# Patient Record
Sex: FEMALE | Race: WHITE | NOT HISPANIC OR LATINO | Employment: FULL TIME
[De-identification: names, ages, dates, MRNs, and addresses within clinical notes are randomized per-mention and may not be internally consistent; named-entity substitution may affect disease eponyms.]

---

## 2017-09-19 ENCOUNTER — CHARTING TRANS (OUTPATIENT)
Dept: OTHER | Age: 42
End: 2017-09-19

## 2017-09-19 ENCOUNTER — IMAGING SERVICES (OUTPATIENT)
Dept: OTHER | Age: 42
End: 2017-09-19

## 2017-09-19 ENCOUNTER — HOSPITAL (OUTPATIENT)
Dept: OTHER | Age: 42
End: 2017-09-19
Attending: OBSTETRICS & GYNECOLOGY

## 2017-09-19 ENCOUNTER — LAB SERVICES (OUTPATIENT)
Dept: OTHER | Age: 42
End: 2017-09-19

## 2017-10-01 ENCOUNTER — CHARTING TRANS (OUTPATIENT)
Dept: OTHER | Age: 42
End: 2017-10-01

## 2017-10-09 LAB — PAP WITH HIGH RISK HPV: NORMAL

## 2017-12-08 ENCOUNTER — IMAGING SERVICES (OUTPATIENT)
Dept: OTHER | Age: 42
End: 2017-12-08

## 2017-12-08 ENCOUNTER — HOSPITAL (OUTPATIENT)
Dept: OTHER | Age: 42
End: 2017-12-08
Attending: OBSTETRICS & GYNECOLOGY

## 2017-12-14 ENCOUNTER — CHARTING TRANS (OUTPATIENT)
Dept: OTHER | Age: 42
End: 2017-12-14

## 2018-06-17 ENCOUNTER — HOSPITAL (OUTPATIENT)
Dept: OTHER | Age: 43
End: 2018-06-17
Attending: EMERGENCY MEDICINE

## 2018-06-17 ENCOUNTER — DIAGNOSTIC TRANS (OUTPATIENT)
Dept: OTHER | Age: 43
End: 2018-06-17

## 2018-06-17 LAB
ALBUMIN SERPL-MCNC: 3.8 GM/DL (ref 3.6–5.1)
ALP SERPL-CCNC: 77 UNIT/L (ref 45–117)
ALT SERPL-CCNC: 22 UNIT/L
ANALYZER ANC (IANC): ABNORMAL
ANION GAP SERPL CALC-SCNC: 14 MMOL/L (ref 10–20)
APPEARANCE UR: CLEAR
AST SERPL-CCNC: 21 UNIT/L
BASOPHILS # BLD: 0 THOUSAND/MCL (ref 0–0.3)
BASOPHILS NFR BLD: 0 %
BILIRUB CONJ SERPL-MCNC: 0.1 MG/DL (ref 0–0.2)
BILIRUB SERPL-MCNC: 0.4 MG/DL (ref 0.2–1)
BILIRUB UR QL STRIP: NEGATIVE
BUN SERPL-MCNC: 8 MG/DL (ref 6–20)
BUN/CREAT SERPL: 9 (ref 7–25)
CALCIUM SERPL-MCNC: 9.5 MG/DL (ref 8.4–10.2)
CHLORIDE: 103 MMOL/L (ref 98–107)
CO2 SERPL-SCNC: 26 MMOL/L (ref 21–32)
COLOR UR: YELLOW
CREAT SERPL-MCNC: 0.86 MG/DL (ref 0.51–0.95)
DIFFERENTIAL METHOD BLD: ABNORMAL
EOSINOPHIL # BLD: 0.1 THOUSAND/MCL (ref 0.1–0.5)
EOSINOPHIL NFR BLD: 1 %
ERYTHROCYTE [DISTWIDTH] IN BLOOD: 12.5 % (ref 11–15)
GLUCOSE SERPL-MCNC: 83 MG/DL (ref 65–99)
GLUCOSE UR STRIP-MCNC: NEGATIVE MG/DL
HEMATOCRIT: 39.6 % (ref 36–46.5)
HEMOCCULT STL QL: ABNORMAL
HGB BLD-MCNC: 13.9 GM/DL (ref 12–15.5)
KETONES UR STRIP-MCNC: 15 MG/DL
LEUKOCYTE ESTERASE UR QL STRIP: NEGATIVE
LIPASE SERPL-CCNC: 534 UNIT/L (ref 73–393)
LYMPHOCYTES # BLD: 2.3 THOUSAND/MCL (ref 1–4.8)
LYMPHOCYTES NFR BLD: 17 %
MCH RBC QN AUTO: 30.3 PG (ref 26–34)
MCHC RBC AUTO-ENTMCNC: 35.1 GM/DL (ref 32–36.5)
MCV RBC AUTO: 86.3 FL (ref 78–100)
MONOCYTES # BLD: 0.8 THOUSAND/MCL (ref 0.3–0.9)
MONOCYTES NFR BLD: 6 %
NEUTROPHILS # BLD: 10.2 THOUSAND/MCL (ref 1.8–7.7)
NEUTROPHILS NFR BLD: 76 %
NEUTS SEG NFR BLD: ABNORMAL %
NITRITE UR QL STRIP: NEGATIVE
NRBC (NRBCRE): ABNORMAL
PH UR STRIP: 6 UNIT (ref 5–7)
PLATELET # BLD: 386 THOUSAND/MCL (ref 140–450)
POTASSIUM SERPL-SCNC: 3.7 MMOL/L (ref 3.4–5.1)
PROT SERPL-MCNC: 7.6 GM/DL (ref 6.4–8.2)
PROT UR STRIP-MCNC: NEGATIVE MG/DL
RBC # BLD: 4.59 MILLION/MCL (ref 4–5.2)
SODIUM SERPL-SCNC: 139 MMOL/L (ref 135–145)
SP GR UR STRIP: >1.03 (ref 1–1.03)
TROPONIN I SERPL HS-MCNC: <0.02 NG/ML
UROBILINOGEN UR STRIP-MCNC: 0.2 MG/DL (ref 0–1)
WBC # BLD: 13.5 THOUSAND/MCL (ref 4.2–11)

## 2018-08-02 ENCOUNTER — HOSPITAL (OUTPATIENT)
Dept: OTHER | Age: 43
End: 2018-08-02
Attending: OBSTETRICS & GYNECOLOGY

## 2018-08-02 ENCOUNTER — IMAGING SERVICES (OUTPATIENT)
Dept: OTHER | Age: 43
End: 2018-08-02

## 2018-08-03 ENCOUNTER — CHARTING TRANS (OUTPATIENT)
Dept: OTHER | Age: 43
End: 2018-08-03

## 2018-11-02 VITALS
BODY MASS INDEX: 26.34 KG/M2 | HEIGHT: 70 IN | WEIGHT: 184 LBS | SYSTOLIC BLOOD PRESSURE: 110 MMHG | DIASTOLIC BLOOD PRESSURE: 68 MMHG

## 2018-11-15 ENCOUNTER — HOSPITAL (OUTPATIENT)
Dept: OTHER | Age: 43
End: 2018-11-15
Attending: HOSPITALIST

## 2018-11-15 ENCOUNTER — IMAGING SERVICES (OUTPATIENT)
Dept: OTHER | Age: 43
End: 2018-11-15

## 2018-11-15 LAB
ANALYZER ANC (IANC): NORMAL
ANION GAP SERPL CALC-SCNC: 13 MMOL/L (ref 10–20)
BASOPHILS # BLD: 0 THOUSAND/MCL (ref 0–0.3)
BASOPHILS NFR BLD: 0 %
BUN SERPL-MCNC: 11 MG/DL (ref 6–20)
BUN/CREAT SERPL: 11 (ref 7–25)
CALCIUM SERPL-MCNC: 9.5 MG/DL (ref 8.4–10.2)
CHLORIDE: 106 MMOL/L (ref 98–107)
CO2 SERPL-SCNC: 26 MMOL/L (ref 21–32)
CREAT SERPL-MCNC: 1.02 MG/DL (ref 0.51–0.95)
DIFFERENTIAL METHOD BLD: NORMAL
EOSINOPHIL # BLD: 0.2 THOUSAND/MCL (ref 0.1–0.5)
EOSINOPHIL NFR BLD: 1 %
ERYTHROCYTE [DISTWIDTH] IN BLOOD: 12.4 % (ref 11–15)
GLUCOSE SERPL-MCNC: 86 MG/DL (ref 65–99)
HEMATOCRIT: 37.5 % (ref 36–46.5)
HGB BLD-MCNC: 12.6 GM/DL (ref 12–15.5)
LYMPHOCYTES # BLD: 2.7 THOUSAND/MCL (ref 1–4.8)
LYMPHOCYTES NFR BLD: 25 %
MAGNESIUM SERPL-MCNC: 2.2 MG/DL (ref 1.7–2.4)
MCH RBC QN AUTO: 29.5 PG (ref 26–34)
MCHC RBC AUTO-ENTMCNC: 33.6 GM/DL (ref 32–36.5)
MCV RBC AUTO: 87.8 FL (ref 78–100)
MONOCYTES # BLD: 0.8 THOUSAND/MCL (ref 0.3–0.9)
MONOCYTES NFR BLD: 8 %
NEUTROPHILS # BLD: 7.2 THOUSAND/MCL (ref 1.8–7.7)
NEUTROPHILS NFR BLD: 66 %
NEUTS SEG NFR BLD: NORMAL %
NRBC (NRBCRE): NORMAL
PLATELET # BLD: 371 THOUSAND/MCL (ref 140–450)
POTASSIUM SERPL-SCNC: 3.9 MMOL/L (ref 3.4–5.1)
RBC # BLD: 4.27 MILLION/MCL (ref 4–5.2)
SODIUM SERPL-SCNC: 141 MMOL/L (ref 135–145)
WBC # BLD: 10.9 THOUSAND/MCL (ref 4.2–11)

## 2018-12-17 ENCOUNTER — OFFICE VISIT (OUTPATIENT)
Dept: PHYSICAL THERAPY | Facility: HOSPITAL | Age: 43
End: 2018-12-17
Attending: Other
Payer: COMMERCIAL

## 2018-12-17 ENCOUNTER — ORDER TRANSCRIPTION (OUTPATIENT)
Dept: PHYSICAL THERAPY | Facility: HOSPITAL | Age: 43
End: 2018-12-17

## 2018-12-17 DIAGNOSIS — M54.50 LOW BACK PAIN: Primary | ICD-10-CM

## 2018-12-17 PROCEDURE — 97530 THERAPEUTIC ACTIVITIES: CPT | Performed by: PHYSICAL THERAPIST

## 2018-12-17 PROCEDURE — 97163 PT EVAL HIGH COMPLEX 45 MIN: CPT | Performed by: PHYSICAL THERAPIST

## 2018-12-17 PROCEDURE — 97112 NEUROMUSCULAR REEDUCATION: CPT | Performed by: PHYSICAL THERAPIST

## 2018-12-17 NOTE — PROGRESS NOTES
LUMBAR SPINE EVALUATION:   Referring Physician: Dr. Karina Flores  Diagnosis: low back pain   Date of Service: 12/17/2018   Date of Onset: this derick Fragoso is a 37year old y/o female who presents to therapy today with complaints LBP. COLONOSCOPY, POSSIBLE BIOPSY, POSSIBLE POLYPECTOMY 22943 N/A 2/17/2017     Performed by Wesley Blackmon MD at [address] [place] [address]   • ESOPHAGOGASTRODUODENOSCOPY, POSSIBLE BIOPSY, POSSIBLE POLYPECTOMY 77433 N/A 3/7/2013     Performed by Wesley Blackmon, flow. 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   The remainder of the anterior intracranial arterial circulation inclu and she is unable to participate in exercise. She will benefit from skilled PT to improve her core control and her functional mobility. Progress will be limited by her limited ability to exert.       Precautions: in exertional activity, no manual work on eval) (LB, thoracic spine, cervical spine and bilateral knee pain, 3 finger diastasis, anxiety, weight/diet issues, unable to work out, squat, tolerate sitting/standing/walking, significant ADL restrictions).       Total Timed treatment: 60 min      Total T

## 2018-12-27 ENCOUNTER — OFFICE VISIT (OUTPATIENT)
Dept: PHYSICAL THERAPY | Facility: HOSPITAL | Age: 43
End: 2018-12-27
Attending: Other
Payer: COMMERCIAL

## 2018-12-27 PROCEDURE — 97112 NEUROMUSCULAR REEDUCATION: CPT | Performed by: PHYSICAL THERAPIST

## 2018-12-27 NOTE — PROGRESS NOTES
12/27/2018  Dx:  Low back pain             Authorized # of Visits:  2/18          Next MD visit: none   Fall Risk: standard         Precautions: see below       Past Medical History:   Diagnosis Date   • Allergic rhinitis     • Anxiety state, unspecified   Aki Taylor MD on 11/7/2018  4:14 PM      Study Result     DATE OF SERVICE: 11.06.2018  MR ANGIOGRAM (MRA) BRAIN STUDY  HISTORY: Pulsatile tinnitus, left ear  TECHNIQUE: Routine 3D Time-of-flight imaging of the [place] of [place] was performed without the use she is feeling ok. Just has some tightness in her L UT. States she feels very \"unhealthy\" and \"like she is an old women\". States she is going to start working on her diet but hasn't started yet.   Also reports she has been too busy to walk because of 12/17/2018   Date of Onset: this fall    PATIENT Мария Brandt is a 37year old y/o female who presents to therapy today with complaints LBP. Reports her back pain comes and goes. States she had pain down her R buttock and R LE to the knee.   Stat MD at [address]   • ESOPHAGOGASTRODUODENOSCOPY, POSSIBLE BIOPSY, POSSIBLE POLYPECTOMY 66517 N/A 3/7/2013     Performed by Maribel Bernal MD at [address] [facility] [address] REPORT          benign tumor of uterus - fibrothe 4.8 x 3.6 x 6.0 mm in orthogonal axial and craniocaudad  dimensions, respectively.   The remainder of the anterior intracranial arterial circulation including the intracranial internal  carotid arteries, middle cerebral arteries, and anterior cerebral arter control and her functional mobility. Progress will be limited by her limited ability to exert.       Precautions: in exertional activity, no manual work on the cervical spine   OBJECTIVE:   Observation/Posture: FHP, increased thoracic kyphosis, forward, ro

## 2019-01-08 ENCOUNTER — OFFICE VISIT (OUTPATIENT)
Dept: PHYSICAL THERAPY | Facility: HOSPITAL | Age: 44
End: 2019-01-08
Attending: Other
Payer: COMMERCIAL

## 2019-01-08 PROCEDURE — 97112 NEUROMUSCULAR REEDUCATION: CPT | Performed by: PHYSICAL THERAPIST

## 2019-01-08 NOTE — PROGRESS NOTES
1/8/2019  Dx: Low back pain           Authorized # of Visits:  3/18          Next MD visit: none   Fall Risk: standard         Precautions: see below       Past Medical History:   Diagnosis Date   • Allergic rhinitis     • Anxiety state, unspecified     • MD Lissette on 11/7/2018  4:14 PM      Study Result     DATE OF SERVICE: 11.06.2018  MR ANGIOGRAM (MRA) BRAIN STUDY  HISTORY: Pulsatile tinnitus, left ear  TECHNIQUE: Routine 3D Time-of-flight imaging of the [place] of [place] was performed without the use  of went gluten free a week ago and has cut down dramatically on sugar. Has gotten a lot of HA's from changing her diet. Has been doing his HEP. Got a massage but stated they avoided her neck. Reports she is very anxious about her artery.   Reports she isn' Exercises; Neuromuscular Re-education; Therapeutic Activity; Patient education: Home exercise program instruction; TNE Education; Modalities as needed.     Education or treatment limitation: None  Rehab Potential:good     FOTO: 50% limitation currently, 42 rhinitis     • Anxiety state, unspecified     • Backache, unspecified     • Esophageal reflux EGD 3/13   • Fibroid     • IBS     • Other forms of migraine     • Vitamin D insufficiency              Past Surgical History:   Procedure Laterality Date   • COL [place] of [place] was performed without the use  of intravenous contrast. 3D reconstructions were performed. Acquisitions also supplemented by 3D MIP  images, constructed at the acquisition scanner. Closed 1. 5-Shivani multichannel MR system was  utilized.   FI the treatment of her LBP as she has been instructed not to have treatment on her cervical spine because of her current condition.      She presents to therapy with limited lumbar ROM with significantly decreased lumbar extension, a 3 plus finger diastasis, bilaterally  Accessory Motion: NT   Special Tests: (-) Slump, (-) SLR bilaterally    (mod level eval) (LB, thoracic spine, cervical spine and bilateral knee pain, 3 finger diastasis, anxiety, weight/diet issues, unable to work out, squat, tolerate sitting/

## 2019-01-15 ENCOUNTER — OFFICE VISIT (OUTPATIENT)
Dept: PHYSICAL THERAPY | Facility: HOSPITAL | Age: 44
End: 2019-01-15
Attending: Other
Payer: COMMERCIAL

## 2019-01-15 PROCEDURE — 97112 NEUROMUSCULAR REEDUCATION: CPT | Performed by: PHYSICAL THERAPIST

## 2019-01-15 NOTE — PROGRESS NOTES
1/15/2019  Dx: Low back pain           Authorized # of Visits:  4/18          Next MD visit: none   Fall Risk: standard         Precautions: see below       Past Medical History:   Diagnosis Date   • Allergic rhinitis     • Anxiety state, unspecified     • MD Lissette on 11/7/2018  4:14 PM      Study Result     DATE OF SERVICE: 11.06.2018  MR ANGIOGRAM (MRA) BRAIN STUDY  HISTORY: Pulsatile tinnitus, left ear  TECHNIQUE: Routine 3D Time-of-flight imaging of the [place] of [place] was performed without the use  of her hips were sore after dong the exercises. Reports her neck has been feeling ok. Neck is better than is was at the last treatment. Reports she has been working on her HEP inconsistently.     Objective:    12/27/2018  All exercises done in supine this d Patient education: Home exercise program instruction; TNE Education; Modalities as needed.     Education or treatment limitation: None  Rehab Potential:good     FOTO: 50% limitation currently, 42% predicted at visit #12  Modified Oswestry: 33  Physical Fear unspecified     • Esophageal reflux EGD 3/13   • Fibroid     • IBS     • Other forms of migraine     • Vitamin D insufficiency              Past Surgical History:   Procedure Laterality Date   • COLONOSCOPY   2/17/17= Diverticulosis     Repeat 2027.   Needs contrast. 3D reconstructions were performed. Acquisitions also supplemented by 3D MIP  images, constructed at the acquisition scanner. Closed 1. 5-Shivani multichannel MR system was  utilized.   FINDINGS: Short segment focal dissection associated pseudoaneurys treatment on her cervical spine because of her current condition. She presents to therapy with limited lumbar ROM with significantly decreased lumbar extension, a 3 plus finger diastasis, poor abdominal control with increased use of accessory muscles. bilaterally    (mod level eval) (LB, thoracic spine, cervical spine and bilateral knee pain, 3 finger diastasis, anxiety, weight/diet issues, unable to work out, squat, tolerate sitting/standing/walking, significant ADL restrictions).              1/8/2019 true lumen. Neurology consultation is advised and appropriate follow-up depending on the remainder the patient's  clinical situation. Key findings discussed with Dr. Dorothy Emmanuel on 11/7/2018 4:11 PM.  CONCLUSION: As above. Addended by Kim Burton.  MD Lissette of  the true lumen. 2. Otherwise, unremarkable MRA brain study. PRECAUTIONS:  Was told she can walk but not to exert. Was told not to have anything done on her neck.         Medication Changes since last visit?: No  Subjective: Reports she went glu independent in her HEP. 5.  The pt will display proper body mechanics during ADL's. Frequency / Duration: Patient will be seen for 1-2 x/week or a total of 18 visits over a 90 day period. Treatment will include: Manual Therapy;  Therapeutic Exercises; Cristina describes prior level of function independent in all activities. Pt goals include decrease her pain  . Past medical history was reviewed with Cristina.  Significant findings include:    Past Medical History:   Diagnosis Date   • Allergic rhinitis     • An above.   Addended by Brayden Clark.  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 She reports she saw a chiropractor for years for neck and back pain.   She presents for the treatment of h Flexor Long  long     LE ROM:    R L   Hip Flex NT NT   ER 40 30   IR 30 40   Hip Abd [facility]   Hip Ext NT NT     Palpation: crepitus in both knees with ROM  Neuro Screen: (-) toe walking (-) heel walking, SLS less than 30 seconds bilaterally  Accessory Mo

## 2019-01-22 ENCOUNTER — OFFICE VISIT (OUTPATIENT)
Dept: PHYSICAL THERAPY | Facility: HOSPITAL | Age: 44
End: 2019-01-22
Attending: Other
Payer: COMMERCIAL

## 2019-01-22 PROCEDURE — 97112 NEUROMUSCULAR REEDUCATION: CPT | Performed by: PHYSICAL THERAPIST

## 2019-01-22 NOTE — PROGRESS NOTES
1/22/2019  Dx: Low back pain           Authorized # of Visits:  5/18          Next MD visit: none   Fall Risk: standard         Precautions: see below       Past Medical History:   Diagnosis Date   • Allergic rhinitis     • Anxiety state, unspecified     • MD Lissette on 11/7/2018  4:14 PM      Study Result     DATE OF SERVICE: 11.06.2018  MR ANGIOGRAM (MRA) BRAIN STUDY  HISTORY: Pulsatile tinnitus, left ear  TECHNIQUE: Routine 3D Time-of-flight imaging of the [place] of [place] was performed without the use  of she is very stressed today. States it is hard to get her exercises in without interruption at home. States she does a couple of exercises at a time but does report working on her breathing often.   Objective:    12/27/2018  All exercises done in supine th include: Manual Therapy; Therapeutic Exercises; Neuromuscular Re-education; Therapeutic Activity; Patient education: Home exercise program instruction; TNE Education; Modalities as needed.     Education or treatment limitation: None  Rehab Potential:good History:   Diagnosis Date   • Allergic rhinitis     • Anxiety state, unspecified     • Backache, unspecified     • Esophageal reflux EGD 3/13   • Fibroid     • IBS     • Other forms of migraine     • Vitamin D insufficiency              Past Surgical Histo Routine 3D Time-of-flight imaging of the [place] of [place] was performed without the use  of intravenous contrast. 3D reconstructions were performed. Acquisitions also supplemented by 3D MIP  images, constructed at the acquisition scanner. Closed 1. 5-Shivani neck and back pain. She presents for the treatment of her LBP as she has been instructed not to have treatment on her cervical spine because of her current condition.      She presents to therapy with limited lumbar ROM with significantly decreased lumbar walking, SLS less than 30 seconds bilaterally  Accessory Motion: NT   Special Tests: (-) Slump, (-) SLR bilaterally    (mod level eval) (LB, thoracic spine, cervical spine and bilateral knee pain, 3 finger diastasis, anxiety, weight/diet issues, unable to internal carotid artery at the level of the tip of the odontoid process. No significant stenosis of the true lumen. Neurology consultation is advised and appropriate follow-up depending on the remainder the patient's  clinical situation.   Key findings di cervical  internal carotid artery at the level of the tip of the odontoid process. No significant stenosis of  the true lumen. 2. Otherwise, unremarkable MRA brain study. PRECAUTIONS:  Was told she can walk but not to exert.   Was told not to have a pain.  3.  The pt will be able to complete a 1 mile walk without an increase in symptoms. 4.  The pt will be independent in her HEP. 5.  The pt will display proper body mechanics during ADL's.     Frequency / Duration: Patient will be seen for 1-2 x/week was told she does not. Current functional limitations include limited standing, walking, bending, lifting. Cristina describes prior level of function independent in all activities. Pt goals include decrease her pain  .   Past medical history was reviewed wi patient's  clinical situation. Key findings discussed with Dr. Drew Worthy on 11/7/2018 4:11 PM.  CONCLUSION: As above. Addended by Stefany Morillo.  MD Lissette on 11/7/2018  4:14 PM      Study Result     DATE OF SERVICE: 11.06.2018  MR ANGIOGRAM (MRA) BRAIN STUDY walk but not to exert. Was told not to have anything done on her neck.           ASSESSMENT:     Mrs. 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

## 2019-02-05 ENCOUNTER — APPOINTMENT (OUTPATIENT)
Dept: PHYSICAL THERAPY | Facility: HOSPITAL | Age: 44
End: 2019-02-05
Attending: Other
Payer: COMMERCIAL

## 2019-02-11 ENCOUNTER — OFFICE VISIT (OUTPATIENT)
Dept: PHYSICAL THERAPY | Facility: HOSPITAL | Age: 44
End: 2019-02-11
Attending: Other
Payer: COMMERCIAL

## 2019-02-11 PROCEDURE — 97112 NEUROMUSCULAR REEDUCATION: CPT | Performed by: PHYSICAL THERAPIST

## 2019-02-11 NOTE — PROGRESS NOTES
2/11/2019  Dx: Low back pain           Authorized # of Visits:  6/18          Next MD visit: none   Fall Risk: standard         Precautions: see below       Past Medical History:   Diagnosis Date   • Allergic rhinitis     • Anxiety state, unspecified     • MD Lissette on 11/7/2018  4:14 PM      Study Result     DATE OF SERVICE: 11.06.2018  MR ANGIOGRAM (MRA) BRAIN STUDY  HISTORY: Pulsatile tinnitus, left ear  TECHNIQUE: Routine 3D Time-of-flight imaging of the [place] of [place] was performed without the use  of better. Back hasn't been bothering her much. Had a couple of days when she didn't notice the sound in her ear. States her R knee was painful after walking slowly in the TM at home.   Objective:    12/27/2018  All exercises done in supine this date for 1-2 x/week or a total of 18 visits over a 90 day period. Treatment will include: Manual Therapy; Therapeutic Exercises; Neuromuscular Re-education; Therapeutic Activity;   Patient education: Home exercise program instruction; TNE Education; Modalities was reviewed with Cristina.  Significant findings include:    Past Medical History:   Diagnosis Date   • Allergic rhinitis     • Anxiety state, unspecified     • Backache, unspecified     • Esophageal reflux EGD 3/13   • Fibroid     • IBS     • Other forms of mi BRAIN STUDY  HISTORY: Pulsatile tinnitus, left ear  TECHNIQUE: Routine 3D Time-of-flight imaging of the [place] of [place] was performed without the use  of intravenous contrast. 3D reconstructions were performed.  Acquisitions also supplemented by 3D MIP  im manipulation. She reports she saw a chiropractor for years for neck and back pain. She presents for the treatment of her LBP as she has been instructed not to have treatment on her cervical spine because of her current condition.      She presents to ther both knees with ROM  Neuro Screen: (-) toe walking (-) heel walking, SLS less than 30 seconds bilaterally  Accessory Motion: NT   Special Tests: (-) Slump, (-) SLR bilaterally    (mod level eval) (LB, thoracic spine, cervical spine and bilateral knee pain, with associated pseudoaneurysm  involving the left cervical internal carotid artery at the level of the tip of the odontoid process. No significant stenosis of the true lumen.   Neurology consultation is advised and appropriate follow-up depending on the r dissection with associated pseudoaneurysm involving the left cervical  internal carotid artery at the level of the tip of the odontoid process. No significant stenosis of  the true lumen. 2. Otherwise, unremarkable MRA brain study.         PRECAUTIONS:  Wa muscles of breathing. 2.  The pt will report 25% decrease in pain. 3.  The pt will be able to complete a 1 mile walk without an increase in symptoms. 4.  The pt will be independent in her HEP. 5.  The pt will display proper body mechanics during ADL's. because she feels unstable. States she thought she had MS but was told she does not. Current functional limitations include limited standing, walking, bending, lifting. Cristina describes prior level of function independent in all activities.  Pt goals inc advised and appropriate follow-up depending on the remainder the patient's  clinical situation. Key findings discussed with Dr. Malka Montgomery on 11/7/2018 4:11 PM.  CONCLUSION: As above. Addended by Arabella Mclaughlin MD on 11/7/2018  4:14 PM      Study Result unremarkable MRA brain study. Was told she can walk but not to exert. Was told not to have anything done on her neck.           ASSESSMENT:     Mrs. Ledy Ordonez presents to therapy with a complex medical hx that includes a carotid artery di Ankle PF (S1) NT NT    Hip Abduction NT NT    Hip Extension NT NT      Flexibility:      R L   Hamstrings 90 degrees with SLR 90 degrees with SLR   Piriformis long long   Hip Flexor Long  long     LE ROM:    R L   Hip Flex NT NT   ER 40 30   IR 30 40   H

## 2019-02-18 ENCOUNTER — APPOINTMENT (OUTPATIENT)
Dept: PHYSICAL THERAPY | Facility: HOSPITAL | Age: 44
End: 2019-02-18
Attending: Other
Payer: COMMERCIAL

## 2019-02-25 ENCOUNTER — OFFICE VISIT (OUTPATIENT)
Dept: PHYSICAL THERAPY | Facility: HOSPITAL | Age: 44
End: 2019-02-25
Attending: Other
Payer: COMMERCIAL

## 2019-02-25 PROCEDURE — 97112 NEUROMUSCULAR REEDUCATION: CPT | Performed by: PHYSICAL THERAPIST

## 2019-02-25 NOTE — PROGRESS NOTES
2/25/2019  Dx: Low back pain           Authorized # of Visits:  7/18          Next MD visit: none   Fall Risk: standard         Precautions: see below       Past Medical History:   Diagnosis Date   • Allergic rhinitis     • Anxiety state, unspecified     • MD Lissette on 11/7/2018  4:14 PM      Study Result     DATE OF SERVICE: 11.06.2018  MR ANGIOGRAM (MRA) BRAIN STUDY  HISTORY: Pulsatile tinnitus, left ear  TECHNIQUE: Routine 3D Time-of-flight imaging of the [place] of [place] was performed without the use  of her back has been feeling \"pretty good\". Reports she has felt some tightness in her back and neck when she is stressed. States her head did not hurt on her last plane trip. Also reports she no longer hears the \"swishing\" in her L ear all the time. questions. Goals:    1. The pt will display IO/TA contraction with out the use of the assessory muscles of breathing. MET 2/25/2019  2. The pt will report 25% decrease in pain. MET 2/25/2019  3.   The pt will be able to complete a 1 mile walk without a thought she had MS but was told she does not. Current functional limitations include limited standing, walking, bending, lifting. Cristina describes prior level of function independent in all activities. Pt goals include decrease her pain  .   Past medical on the remainder the patient's  clinical situation. Key findings discussed with Dr. Josephine Lisa on 11/7/2018 4:11 PM.  CONCLUSION: As above. Addended by Alayna Gutierrez.  MD Lissette on 11/7/2018  4:14 PM      Study Result     DATE OF SERVICE: 11.06.2018  MR Liya Ramos Was told she can walk but not to exert. Was told not to have anything done on her neck.           ASSESSMENT:     Mrs. 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 presumably from prolonged c Hip Extension NT NT      Flexibility:      R L   Hamstrings 90 degrees with SLR 90 degrees with SLR   Piriformis long long   Hip Flexor Long  long     LE ROM:    R L   Hip Flex NT NT   ER 40 30   IR 30 40   Hip Abd [facility]   Hip Ext NT NT     Palpation: ESOPHAGOGASTRODUODENOSCOPY, POSSIBLE BIOPSY, POSSIBLE POLYPECTOMY 72753;  Surgeon: Abraham Escobar MD;  Location: [facility] 75: 11.06.2018  ADDENDUM: MRA brain 11/6/2018  As detailed in the original report, short segment bilateral posterior cerebral arteries demonstrate no definite significant  stenosis, occlusion, or evidence to suggest aneurysm. Note: Aneurysms smaller than 3 mm in size are difficult to the detect by MR angiography technique.  =====  IMPRESSION:   1.  Sh accessory muscles. Educated patient on diastasis and need to increased approximate her musculature to improve her core stability. Given an updated HEP with the above stated exercises. Goals:    1.   The pt will display IO/TA contraction with out the us pushes in a  and elementary school. Does have to get off the floor and reaching across the table. No elevator and has to lift her suitcase up the stairs. Unloading grocery is challenging. Stress will increase her symptoms.       Would love 11/6/2018  As detailed in the original report, short segment focal dissection with associated pseudoaneurysm  involving the left cervical internal carotid artery at the level of the tip of the odontoid process. No significant stenosis of the true lumen. by MR angiography technique.  =====  IMPRESSION:   1. 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  No significant stenosis of  the true l L Sideglide NT        GLORIA Testing:  Hruska Adduction Drop Test: (+) bilaterally  PADT: (+) bilaterally    STRENGTH:   5/5 MMT Scale   Left  Right Comments   Hip Flexion (L2) NT NT    Knee Extension (L3) NT NT    Knee Flexion NT NT    Ankle DF (L4) NT NT

## 2019-05-08 ENCOUNTER — OFFICE VISIT (OUTPATIENT)
Dept: INTERVENTIONAL RADIOLOGY/VASCULAR | Age: 44
End: 2019-05-08

## 2019-05-08 VITALS
SYSTOLIC BLOOD PRESSURE: 110 MMHG | HEIGHT: 70 IN | WEIGHT: 196.65 LBS | DIASTOLIC BLOOD PRESSURE: 65 MMHG | BODY MASS INDEX: 28.15 KG/M2 | HEART RATE: 92 BPM

## 2019-05-08 DIAGNOSIS — I77.71 CAROTID ARTERY DISSECTION (CMD): Primary | ICD-10-CM

## 2019-05-08 PROCEDURE — 99244 OFF/OP CNSLTJ NEW/EST MOD 40: CPT | Performed by: RADIOLOGY

## 2019-05-08 RX ORDER — MULTIVITAMIN
TABLET ORAL
COMMUNITY
End: 2019-07-30 | Stop reason: ALTCHOICE

## 2019-05-08 RX ORDER — ALPRAZOLAM 0.5 MG/1
0.5 TABLET ORAL 2 TIMES DAILY
Refills: 2 | COMMUNITY
Start: 2019-03-07

## 2019-05-08 RX ORDER — LEVONORGESTREL / ETHINYL ESTRADIOL AND ETHINYL ESTRADIOL 150-30(84)
1 KIT ORAL
COMMUNITY
Start: 2018-10-02 | End: 2019-07-30 | Stop reason: SDUPTHER

## 2019-05-08 RX ORDER — CYCLOBENZAPRINE HCL 10 MG
10 TABLET ORAL PRN
COMMUNITY
Start: 2018-12-13 | End: 2022-09-28 | Stop reason: SDUPTHER

## 2019-05-08 RX ORDER — HYOSCYAMINE SULFATE 0.125 MG
125 TABLET ORAL
COMMUNITY
Start: 2017-04-18 | End: 2019-07-30 | Stop reason: ALTCHOICE

## 2019-05-08 RX ORDER — FLUOXETINE HYDROCHLORIDE 40 MG/1
40 CAPSULE ORAL DAILY
Status: ON HOLD | COMMUNITY
End: 2022-07-21

## 2019-05-08 RX ORDER — ZOLPIDEM TARTRATE 10 MG/1
TABLET ORAL
COMMUNITY
Start: 2019-02-11 | End: 2019-07-30 | Stop reason: ALTCHOICE

## 2019-05-08 RX ORDER — DIAZEPAM 10 MG/1
10 TABLET ORAL PRN
Status: ON HOLD | COMMUNITY
Start: 2018-10-02 | End: 2022-07-21

## 2019-05-08 RX ORDER — GABAPENTIN 300 MG/1
300 CAPSULE ORAL AT BEDTIME
Status: ON HOLD | COMMUNITY
End: 2022-07-21

## 2019-05-08 RX ORDER — FEXOFENADINE HCL 180 MG/1
180 TABLET ORAL DAILY
COMMUNITY

## 2019-05-08 RX ORDER — PANTOPRAZOLE SODIUM 40 MG/1
40 TABLET, DELAYED RELEASE ORAL DAILY
COMMUNITY
Start: 2018-08-30

## 2019-05-14 ASSESSMENT — ENCOUNTER SYMPTOMS
GASTROINTESTINAL NEGATIVE: 1
SHORTNESS OF BREATH: 0
DIZZINESS: 1
HEADACHES: 1
PHOTOPHOBIA: 0
CHEST TIGHTNESS: 0
LIGHT-HEADEDNESS: 0
FATIGUE: 1
COUGH: 0
SPEECH DIFFICULTY: 0
SINUS PRESSURE: 1
CHILLS: 0
SINUS PAIN: 0
SORE THROAT: 0
FEVER: 0

## 2019-06-24 ENCOUNTER — TELEPHONE (OUTPATIENT)
Dept: OBGYN | Age: 44
End: 2019-06-24

## 2019-06-24 RX ORDER — FLUCONAZOLE 150 MG/1
TABLET ORAL
Qty: 2 TABLET | Refills: 0 | Status: SHIPPED | OUTPATIENT
Start: 2019-06-24 | End: 2019-07-30 | Stop reason: ALTCHOICE

## 2019-07-30 ENCOUNTER — OFFICE VISIT (OUTPATIENT)
Dept: OBGYN | Age: 44
End: 2019-07-30

## 2019-07-30 VITALS
HEIGHT: 70 IN | BODY MASS INDEX: 27.63 KG/M2 | DIASTOLIC BLOOD PRESSURE: 60 MMHG | SYSTOLIC BLOOD PRESSURE: 100 MMHG | WEIGHT: 193 LBS

## 2019-07-30 DIAGNOSIS — Z98.890 H/O OVARIAN CYSTECTOMY: ICD-10-CM

## 2019-07-30 DIAGNOSIS — Z01.419 GYNECOLOGIC EXAM NORMAL: Primary | ICD-10-CM

## 2019-07-30 DIAGNOSIS — Z87.42 H/O OVARIAN CYSTECTOMY: ICD-10-CM

## 2019-07-30 PROCEDURE — 99396 PREV VISIT EST AGE 40-64: CPT | Performed by: NURSE PRACTITIONER

## 2019-07-30 RX ORDER — LEVONORGESTREL / ETHINYL ESTRADIOL AND ETHINYL ESTRADIOL 150-30(84)
1 KIT ORAL DAILY
Qty: 90 TABLET | Refills: 3 | Status: SHIPPED | OUTPATIENT
Start: 2019-07-30 | End: 2020-08-04 | Stop reason: SDUPTHER

## 2019-07-30 SDOH — SOCIAL STABILITY: SOCIAL INSECURITY: WITHIN THE LAST YEAR, HAVE YOU BEEN HUMILIATED OR EMOTIONALLY ABUSED IN OTHER WAYS BY YOUR PARTNER OR EX-PARTNER?: NO

## 2019-07-30 SDOH — SOCIAL STABILITY: SOCIAL INSECURITY
WITHIN THE LAST YEAR, HAVE TO BEEN RAPED OR FORCED TO HAVE ANY KIND OF SEXUAL ACTIVITY BY YOUR PARTNER OR EX-PARTNER?: NO

## 2019-07-30 SDOH — SOCIAL STABILITY: SOCIAL INSECURITY: WITHIN THE LAST YEAR, HAVE YOU BEEN AFRAID OF YOUR PARTNER OR EX-PARTNER?: NO

## 2019-07-30 SDOH — SOCIAL STABILITY: SOCIAL INSECURITY
WITHIN THE LAST YEAR, HAVE YOU BEEN KICKED, HIT, SLAPPED, OR OTHERWISE PHYSICALLY HURT BY YOUR PARTNER OR EX-PARTNER?: NO

## 2019-07-30 SDOH — HEALTH STABILITY: PHYSICAL HEALTH: ON AVERAGE, HOW MANY DAYS PER WEEK DO YOU ENGAGE IN MODERATE TO STRENUOUS EXERCISE (LIKE A BRISK WALK)?: 0 DAYS

## 2019-08-01 ENCOUNTER — APPOINTMENT (OUTPATIENT)
Dept: OBGYN | Age: 44
End: 2019-08-01

## 2019-08-05 ENCOUNTER — TELEPHONE (OUTPATIENT)
Dept: OBGYN | Age: 44
End: 2019-08-05

## 2019-08-05 ENCOUNTER — HOSPITAL (OUTPATIENT)
Dept: OTHER | Age: 44
End: 2019-08-05
Attending: OBSTETRICS & GYNECOLOGY

## 2019-08-06 LAB — HPV16+18+45 E6+E7MRNA CVX NAA+PROBE: NORMAL

## 2019-08-07 ENCOUNTER — HOSPITAL (OUTPATIENT)
Dept: OTHER | Age: 44
End: 2019-08-07
Attending: OBSTETRICS & GYNECOLOGY

## 2019-08-23 ENCOUNTER — HOSPITAL (OUTPATIENT)
Dept: OTHER | Age: 44
End: 2019-08-23

## 2019-08-23 LAB
ALBUMIN SERPL-MCNC: 3.2 G/DL (ref 3.6–5.1)
ALBUMIN/GLOB SERPL: 0.9 {RATIO} (ref 1–2.4)
ALP SERPL-CCNC: 57 UNITS/L (ref 45–117)
ALT SERPL-CCNC: 19 UNITS/L
ANALYZER ANC (IANC): ABNORMAL
ANALYZER ANC (IANC): ABNORMAL
ANION GAP SERPL CALC-SCNC: 11 MMOL/L (ref 10–20)
ANION GAP SERPL CALC-SCNC: 11 MMOL/L (ref 10–20)
APTT PPP: 25 SEC (ref 22–32)
APTT PPP: NORMAL S
APTT PPP: NORMAL S
AST SERPL-CCNC: 15 UNITS/L
BASOPHILS # BLD: 0 K/MCL (ref 0–0.3)
BASOPHILS # BLD: 0 K/MCL (ref 0–0.3)
BASOPHILS NFR BLD: 0 %
BASOPHILS NFR BLD: 0 %
BILIRUB SERPL-MCNC: 0.2 MG/DL (ref 0.2–1)
BUN SERPL-MCNC: 13 MG/DL (ref 6–20)
BUN SERPL-MCNC: 14 MG/DL (ref 6–20)
BUN/CREAT SERPL: 15 (ref 7–25)
BUN/CREAT SERPL: 15 (ref 7–25)
CALCIUM SERPL-MCNC: 8.9 MG/DL (ref 8.4–10.2)
CALCIUM SERPL-MCNC: 9 MG/DL (ref 8.4–10.2)
CHLORIDE SERPL-SCNC: 109 MMOL/L (ref 98–107)
CHLORIDE SERPL-SCNC: 109 MMOL/L (ref 98–107)
CO2 SERPL-SCNC: 25 MMOL/L (ref 21–32)
CO2 SERPL-SCNC: 25 MMOL/L (ref 21–32)
CREAT SERPL-MCNC: 0.85 MG/DL (ref 0.51–0.95)
CREAT SERPL-MCNC: 0.94 MG/DL (ref 0.51–0.95)
DIFFERENTIAL METHOD BLD: ABNORMAL
DIFFERENTIAL METHOD BLD: ABNORMAL
EOSINOPHIL # BLD: 0.1 K/MCL (ref 0.1–0.5)
EOSINOPHIL # BLD: 0.1 K/MCL (ref 0.1–0.5)
EOSINOPHIL NFR BLD: 1 %
EOSINOPHIL NFR BLD: 2 %
ERYTHROCYTE [DISTWIDTH] IN BLOOD: 12 % (ref 11–15)
ERYTHROCYTE [DISTWIDTH] IN BLOOD: 12 % (ref 11–15)
GLOBULIN SER-MCNC: 3.4 G/DL (ref 2–4)
GLUCOSE SERPL-MCNC: 84 MG/DL (ref 65–99)
GLUCOSE SERPL-MCNC: 91 MG/DL (ref 65–99)
HCT VFR BLD CALC: 33 % (ref 36–46.5)
HCT VFR BLD CALC: 33.9 % (ref 36–46.5)
HGB BLD-MCNC: 11.2 G/DL (ref 12–15.5)
HGB BLD-MCNC: 11.2 G/DL (ref 12–15.5)
IMM GRANULOCYTES # BLD AUTO: 0 K/MCL (ref 0–0.2)
IMM GRANULOCYTES # BLD AUTO: 0 K/MCL (ref 0–0.2)
IMM GRANULOCYTES NFR BLD: 0 %
IMM GRANULOCYTES NFR BLD: 1 %
INR PPP: 1
INR PPP: NORMAL
LYMPHOCYTES # BLD: 2.2 K/MCL (ref 1–4.8)
LYMPHOCYTES # BLD: 3 K/MCL (ref 1–4.8)
LYMPHOCYTES NFR BLD: 26 %
LYMPHOCYTES NFR BLD: 35 %
MAGNESIUM SERPL-MCNC: 2.1 MG/DL (ref 1.7–2.4)
MCH RBC QN AUTO: 28.7 PG (ref 26–34)
MCH RBC QN AUTO: 29.5 PG (ref 26–34)
MCHC RBC AUTO-ENTMCNC: 33 G/DL (ref 32–36.5)
MCHC RBC AUTO-ENTMCNC: 33.9 G/DL (ref 32–36.5)
MCV RBC AUTO: 86.8 FL (ref 78–100)
MCV RBC AUTO: 86.9 FL (ref 78–100)
MONOCYTES # BLD: 0.6 K/MCL (ref 0.3–0.9)
MONOCYTES # BLD: 0.6 K/MCL (ref 0.3–0.9)
MONOCYTES NFR BLD: 7 %
MONOCYTES NFR BLD: 7 %
NEUTROPHILS # BLD: 4.9 K/MCL (ref 1.8–7.7)
NEUTROPHILS # BLD: 5.7 K/MCL (ref 1.8–7.7)
NEUTROPHILS NFR BLD: 56 %
NEUTROPHILS NFR BLD: 65 %
NEUTS SEG NFR BLD: ABNORMAL %
NEUTS SEG NFR BLD: ABNORMAL %
NRBC (NRBCRE): 0 /100 WBC
NRBC (NRBCRE): 0 /100 WBC
PLATELET # BLD: 352 K/MCL (ref 140–450)
PLATELET # BLD: 353 K/MCL (ref 140–450)
POTASSIUM SERPL-SCNC: 3.8 MMOL/L (ref 3.4–5.1)
POTASSIUM SERPL-SCNC: 3.9 MMOL/L (ref 3.4–5.1)
PROT SERPL-MCNC: 6.6 G/DL (ref 6.4–8.2)
PROTHROMBIN TIME (PRT2): NORMAL
PROTHROMBIN TIME: 10.3 SEC (ref 9.7–11.8)
RBC # BLD: 3.8 MIL/MCL (ref 4–5.2)
RBC # BLD: 3.9 MIL/MCL (ref 4–5.2)
SODIUM SERPL-SCNC: 141 MMOL/L (ref 135–145)
SODIUM SERPL-SCNC: 141 MMOL/L (ref 135–145)
WBC # BLD: 8.7 K/MCL (ref 4.2–11)
WBC # BLD: 8.7 K/MCL (ref 4.2–11)

## 2019-08-24 LAB
ANALYZER ANC (IANC): ABNORMAL
BASOPHILS # BLD: 0 K/MCL (ref 0–0.3)
BASOPHILS NFR BLD: 0 %
DIFFERENTIAL METHOD BLD: ABNORMAL
EOSINOPHIL # BLD: 0.1 K/MCL (ref 0.1–0.5)
EOSINOPHIL NFR BLD: 1 %
ERYTHROCYTE [DISTWIDTH] IN BLOOD: 12 % (ref 11–15)
HCT VFR BLD CALC: 31.9 % (ref 36–46.5)
HGB BLD-MCNC: 10.6 G/DL (ref 12–15.5)
IMM GRANULOCYTES # BLD AUTO: 0 K/MCL (ref 0–0.2)
IMM GRANULOCYTES NFR BLD: 0 %
LYMPHOCYTES # BLD: 2.2 K/MCL (ref 1–4.8)
LYMPHOCYTES NFR BLD: 24 %
MCH RBC QN AUTO: 29 PG (ref 26–34)
MCHC RBC AUTO-ENTMCNC: 33.2 G/DL (ref 32–36.5)
MCV RBC AUTO: 87.2 FL (ref 78–100)
MONOCYTES # BLD: 0.6 K/MCL (ref 0.3–0.9)
MONOCYTES NFR BLD: 6 %
NEUTROPHILS # BLD: 6.3 K/MCL (ref 1.8–7.7)
NEUTROPHILS NFR BLD: 69 %
NEUTS SEG NFR BLD: ABNORMAL %
NRBC (NRBCRE): 0 /100 WBC
PLATELET # BLD: 333 K/MCL (ref 140–450)
RBC # BLD: 3.66 MIL/MCL (ref 4–5.2)
WBC # BLD: 9.2 K/MCL (ref 4.2–11)

## 2019-08-31 PROCEDURE — 87081 CULTURE SCREEN ONLY: CPT | Performed by: NURSE PRACTITIONER

## 2020-06-22 ENCOUNTER — TELEPHONE (OUTPATIENT)
Dept: OBGYN | Age: 45
End: 2020-06-22

## 2020-06-22 ENCOUNTER — TELEPHONE (OUTPATIENT)
Dept: NEUROSURGERY | Age: 45
End: 2020-06-22

## 2020-06-22 DIAGNOSIS — I77.71 CAROTID ARTERY DISSECTION (CMD): ICD-10-CM

## 2020-06-22 DIAGNOSIS — I67.1 CEREBRAL ANEURYSM, NONRUPTURED: Primary | ICD-10-CM

## 2020-06-22 DIAGNOSIS — Z12.31 VISIT FOR SCREENING MAMMOGRAM: Primary | ICD-10-CM

## 2020-07-01 ENCOUNTER — HOSPITAL ENCOUNTER (OUTPATIENT)
Dept: MRI IMAGING | Age: 45
Discharge: HOME | End: 2020-07-01
Attending: RADIOLOGY

## 2020-07-01 DIAGNOSIS — I77.71 CAROTID ARTERY DISSECTION (CMD): ICD-10-CM

## 2020-07-01 PROCEDURE — 70547 MR ANGIOGRAPHY NECK W/O DYE: CPT

## 2020-07-21 ENCOUNTER — TELEPHONE (OUTPATIENT)
Dept: NEUROSURGERY | Age: 45
End: 2020-07-21

## 2020-08-03 ENCOUNTER — APPOINTMENT (OUTPATIENT)
Dept: OBGYN | Age: 45
End: 2020-08-03

## 2020-08-04 ENCOUNTER — OFFICE VISIT (OUTPATIENT)
Dept: OBGYN | Age: 45
End: 2020-08-04

## 2020-08-04 ENCOUNTER — TELEPHONE (OUTPATIENT)
Dept: NEUROSURGERY | Age: 45
End: 2020-08-04

## 2020-08-04 VITALS
WEIGHT: 195 LBS | SYSTOLIC BLOOD PRESSURE: 98 MMHG | TEMPERATURE: 97.7 F | DIASTOLIC BLOOD PRESSURE: 62 MMHG | HEIGHT: 70 IN | BODY MASS INDEX: 27.92 KG/M2

## 2020-08-04 DIAGNOSIS — Z30.41 ENCOUNTER FOR SURVEILLANCE OF CONTRACEPTIVE PILLS: ICD-10-CM

## 2020-08-04 DIAGNOSIS — Z01.419 WELL WOMAN EXAM WITH ROUTINE GYNECOLOGICAL EXAM: Primary | ICD-10-CM

## 2020-08-04 DIAGNOSIS — Z11.51 SCREENING FOR HPV (HUMAN PAPILLOMAVIRUS): ICD-10-CM

## 2020-08-04 DIAGNOSIS — Z87.42 H/O OVARIAN CYSTECTOMY: ICD-10-CM

## 2020-08-04 DIAGNOSIS — Z98.890 H/O OVARIAN CYSTECTOMY: ICD-10-CM

## 2020-08-04 PROCEDURE — 99396 PREV VISIT EST AGE 40-64: CPT | Performed by: OBSTETRICS & GYNECOLOGY

## 2020-08-04 RX ORDER — ERGOCALCIFEROL 1.25 MG/1
1.25 CAPSULE ORAL
Status: ON HOLD | COMMUNITY
Start: 2020-06-28 | End: 2022-07-21

## 2020-08-04 RX ORDER — TOPIRAMATE 25 MG/1
TABLET ORAL
Status: ON HOLD | COMMUNITY
Start: 2020-06-28 | End: 2022-07-21

## 2020-08-04 RX ORDER — LEVONORGESTREL / ETHINYL ESTRADIOL AND ETHINYL ESTRADIOL 150-30(84)
1 KIT ORAL DAILY
Qty: 90 TABLET | Refills: 3 | Status: SHIPPED | OUTPATIENT
Start: 2020-08-04 | End: 2021-07-21 | Stop reason: ALTCHOICE

## 2020-08-04 ASSESSMENT — ENCOUNTER SYMPTOMS
PSYCHIATRIC NEGATIVE: 1
NEUROLOGICAL NEGATIVE: 1
RESPIRATORY NEGATIVE: 1
GASTROINTESTINAL NEGATIVE: 1
CONSTITUTIONAL NEGATIVE: 1

## 2020-08-07 ENCOUNTER — HOSPITAL ENCOUNTER (OUTPATIENT)
Dept: MAMMOGRAPHY | Age: 45
Discharge: HOME | End: 2020-08-07
Attending: NURSE PRACTITIONER

## 2020-08-07 ENCOUNTER — APPOINTMENT (OUTPATIENT)
Dept: OBGYN | Age: 45
End: 2020-08-07

## 2020-08-07 DIAGNOSIS — R92.8 ABNORMALITY OF BOTH BREASTS ON SCREENING MAMMOGRAM: Primary | ICD-10-CM

## 2020-08-07 PROCEDURE — 77063 BREAST TOMOSYNTHESIS BI: CPT

## 2020-08-10 ENCOUNTER — TELEPHONE (OUTPATIENT)
Dept: OBGYN | Age: 45
End: 2020-08-10

## 2020-08-12 ENCOUNTER — TELEPHONE (OUTPATIENT)
Dept: SURGERY | Age: 45
End: 2020-08-12

## 2020-08-13 LAB — HPV16+18+45 E6+E7MRNA CVX NAA+PROBE: NORMAL

## 2020-08-14 ENCOUNTER — TELEPHONE (OUTPATIENT)
Dept: OBGYN | Age: 45
End: 2020-08-14

## 2020-08-26 ENCOUNTER — HOSPITAL ENCOUNTER (OUTPATIENT)
Dept: ULTRASOUND IMAGING | Age: 45
Discharge: HOME | End: 2020-08-26
Attending: OBSTETRICS & GYNECOLOGY

## 2020-08-26 ENCOUNTER — HOSPITAL ENCOUNTER (OUTPATIENT)
Dept: MAMMOGRAPHY | Age: 45
Discharge: HOME | End: 2020-08-26
Attending: OBSTETRICS & GYNECOLOGY

## 2020-08-26 DIAGNOSIS — R92.8 ABNORMALITY OF BOTH BREASTS ON SCREENING MAMMOGRAM: ICD-10-CM

## 2020-08-26 PROCEDURE — 76642 ULTRASOUND BREAST LIMITED: CPT

## 2020-08-26 PROCEDURE — 77066 DX MAMMO INCL CAD BI: CPT

## 2020-09-01 ENCOUNTER — TELEPHONE (OUTPATIENT)
Dept: OBGYN | Age: 45
End: 2020-09-01

## 2020-11-05 PROBLEM — M77.11 RIGHT LATERAL EPICONDYLITIS: Status: ACTIVE | Noted: 2020-11-05

## 2021-03-03 DIAGNOSIS — Z23 NEED FOR VACCINATION: ICD-10-CM

## 2021-06-18 ENCOUNTER — TELEPHONE (OUTPATIENT)
Dept: OBGYN | Age: 46
End: 2021-06-18

## 2021-06-18 DIAGNOSIS — Z12.31 ENCOUNTER FOR SCREENING MAMMOGRAM FOR MALIGNANT NEOPLASM OF BREAST: Primary | ICD-10-CM

## 2021-06-28 ENCOUNTER — TELEPHONE (OUTPATIENT)
Dept: OBGYN | Age: 46
End: 2021-06-28

## 2021-07-21 ENCOUNTER — OFFICE VISIT (OUTPATIENT)
Dept: OBGYN | Age: 46
End: 2021-07-21

## 2021-07-21 ENCOUNTER — APPOINTMENT (OUTPATIENT)
Dept: OBGYN | Age: 46
End: 2021-07-21

## 2021-07-21 VITALS
BODY MASS INDEX: 28.49 KG/M2 | WEIGHT: 199 LBS | SYSTOLIC BLOOD PRESSURE: 98 MMHG | DIASTOLIC BLOOD PRESSURE: 60 MMHG | HEIGHT: 70 IN

## 2021-07-21 DIAGNOSIS — N92.6 IRREGULAR MENSTRUAL CYCLE: Primary | ICD-10-CM

## 2021-07-21 PROCEDURE — 76830 TRANSVAGINAL US NON-OB: CPT | Performed by: OBSTETRICS & GYNECOLOGY

## 2021-07-21 PROCEDURE — 99213 OFFICE O/P EST LOW 20 MIN: CPT | Performed by: OBSTETRICS & GYNECOLOGY

## 2021-08-09 ENCOUNTER — APPOINTMENT (OUTPATIENT)
Dept: OBGYN | Age: 46
End: 2021-08-09

## 2021-08-26 ENCOUNTER — HOSPITAL ENCOUNTER (OUTPATIENT)
Dept: MAMMOGRAPHY | Age: 46
Discharge: HOME | End: 2021-08-26
Attending: OBSTETRICS & GYNECOLOGY

## 2021-08-26 DIAGNOSIS — Z12.31 ENCOUNTER FOR SCREENING MAMMOGRAM FOR MALIGNANT NEOPLASM OF BREAST: ICD-10-CM

## 2021-08-26 PROCEDURE — 77063 BREAST TOMOSYNTHESIS BI: CPT

## 2021-08-27 ENCOUNTER — TELEPHONE (OUTPATIENT)
Dept: OBGYN | Age: 46
End: 2021-08-27

## 2021-09-08 DIAGNOSIS — Z87.42 H/O OVARIAN CYSTECTOMY: ICD-10-CM

## 2021-09-08 DIAGNOSIS — Z98.890 H/O OVARIAN CYSTECTOMY: ICD-10-CM

## 2021-09-08 DIAGNOSIS — Z30.41 ENCOUNTER FOR SURVEILLANCE OF CONTRACEPTIVE PILLS: ICD-10-CM

## 2021-09-08 RX ORDER — LEVONORGESTREL / ETHINYL ESTRADIOL AND ETHINYL ESTRADIOL 150-30(84)
KIT ORAL
Qty: 91 TABLET | Refills: 3 | OUTPATIENT
Start: 2021-09-08

## 2021-09-16 ENCOUNTER — OFFICE VISIT (OUTPATIENT)
Dept: OBGYN | Age: 46
End: 2021-09-16

## 2021-09-16 VITALS
TEMPERATURE: 97.3 F | WEIGHT: 188 LBS | DIASTOLIC BLOOD PRESSURE: 68 MMHG | BODY MASS INDEX: 26.92 KG/M2 | SYSTOLIC BLOOD PRESSURE: 104 MMHG | HEIGHT: 70 IN

## 2021-09-16 DIAGNOSIS — N95.1 PERIMENOPAUSE: ICD-10-CM

## 2021-09-16 DIAGNOSIS — Z01.419 WELL WOMAN EXAM WITH ROUTINE GYNECOLOGICAL EXAM: Primary | ICD-10-CM

## 2021-09-16 PROCEDURE — 36415 COLL VENOUS BLD VENIPUNCTURE: CPT

## 2021-09-16 PROCEDURE — 83002 ASSAY OF GONADOTROPIN (LH): CPT | Performed by: OBSTETRICS & GYNECOLOGY

## 2021-09-16 PROCEDURE — 99396 PREV VISIT EST AGE 40-64: CPT | Performed by: OBSTETRICS & GYNECOLOGY

## 2021-09-16 PROCEDURE — 83001 ASSAY OF GONADOTROPIN (FSH): CPT | Performed by: OBSTETRICS & GYNECOLOGY

## 2021-09-16 ASSESSMENT — PATIENT HEALTH QUESTIONNAIRE - PHQ9
1. LITTLE INTEREST OR PLEASURE IN DOING THINGS: NOT AT ALL
SUM OF ALL RESPONSES TO PHQ9 QUESTIONS 1 AND 2: 0
CLINICAL INTERPRETATION OF PHQ2 SCORE: NO FURTHER SCREENING NEEDED
SUM OF ALL RESPONSES TO PHQ9 QUESTIONS 1 AND 2: 0
CLINICAL INTERPRETATION OF PHQ9 SCORE: NO FURTHER SCREENING NEEDED
2. FEELING DOWN, DEPRESSED OR HOPELESS: NOT AT ALL

## 2021-09-17 LAB
FSH SERPL-ACNC: 56.3 MUNITS/ML
LH SERPL-ACNC: 55 MUNITS/ML

## 2022-04-12 ENCOUNTER — OFFICE VISIT (OUTPATIENT)
Dept: NEUROSURGERY | Age: 47
End: 2022-04-12

## 2022-04-12 VITALS
DIASTOLIC BLOOD PRESSURE: 65 MMHG | TEMPERATURE: 98.6 F | BODY MASS INDEX: 20.76 KG/M2 | WEIGHT: 145 LBS | HEIGHT: 70 IN | SYSTOLIC BLOOD PRESSURE: 100 MMHG

## 2022-04-12 DIAGNOSIS — I72.9 PSEUDOANEURYSM (CMD): ICD-10-CM

## 2022-04-12 DIAGNOSIS — I77.71 CAROTID ARTERY DISSECTION (CMD): Primary | ICD-10-CM

## 2022-04-12 PROCEDURE — 99213 OFFICE O/P EST LOW 20 MIN: CPT | Performed by: NURSE PRACTITIONER

## 2022-04-12 RX ORDER — SEMAGLUTIDE 2.4 MG/.75ML
2.4 INJECTION, SOLUTION SUBCUTANEOUS
COMMUNITY
Start: 2022-03-21

## 2022-04-12 RX ORDER — VENLAFAXINE HYDROCHLORIDE 150 MG/1
150 CAPSULE, EXTENDED RELEASE ORAL DAILY
COMMUNITY
Start: 2022-01-02

## 2022-04-18 ASSESSMENT — ENCOUNTER SYMPTOMS
GASTROINTESTINAL NEGATIVE: 1
CHEST TIGHTNESS: 0
SINUS PAIN: 0
FATIGUE: 0
LIGHT-HEADEDNESS: 1
WEAKNESS: 0
ACTIVITY CHANGE: 0
SINUS PRESSURE: 0
HEADACHES: 1
DIZZINESS: 1
PHOTOPHOBIA: 0
SHORTNESS OF BREATH: 0
FEVER: 0
BRUISES/BLEEDS EASILY: 0
NUMBNESS: 0
COUGH: 0

## 2022-04-26 ENCOUNTER — APPOINTMENT (OUTPATIENT)
Dept: MRI IMAGING | Age: 47
End: 2022-04-26
Attending: NURSE PRACTITIONER

## 2022-06-07 ENCOUNTER — TELEPHONE (OUTPATIENT)
Dept: NEUROSURGERY | Age: 47
End: 2022-06-07

## 2022-06-07 ENCOUNTER — TELEPHONE (OUTPATIENT)
Dept: OBGYN | Age: 47
End: 2022-06-07

## 2022-06-08 ENCOUNTER — HOSPITAL ENCOUNTER (OUTPATIENT)
Dept: MRI IMAGING | Age: 47
Discharge: HOME | End: 2022-06-08
Attending: NURSE PRACTITIONER

## 2022-06-08 DIAGNOSIS — I72.9 PSEUDOANEURYSM (CMD): ICD-10-CM

## 2022-06-08 DIAGNOSIS — I77.71 CAROTID ARTERY DISSECTION (CMD): ICD-10-CM

## 2022-06-08 PROCEDURE — G1004 CDSM NDSC: HCPCS

## 2022-06-08 PROCEDURE — 70547 MR ANGIOGRAPHY NECK W/O DYE: CPT

## 2022-06-08 PROCEDURE — 70544 MR ANGIOGRAPHY HEAD W/O DYE: CPT

## 2022-06-15 ENCOUNTER — TELEPHONE (OUTPATIENT)
Dept: NEUROSURGERY | Age: 47
End: 2022-06-15

## 2022-06-21 ENCOUNTER — TELEPHONE (OUTPATIENT)
Dept: NEUROSURGERY | Age: 47
End: 2022-06-21

## 2022-06-21 DIAGNOSIS — I67.1 CEREBRAL ANEURYSM, NONRUPTURED: ICD-10-CM

## 2022-06-21 DIAGNOSIS — I72.9 PSEUDOANEURYSM (CMD): Primary | ICD-10-CM

## 2022-06-21 RX ORDER — ASPIRIN 325 MG
325 TABLET ORAL DAILY
Qty: 30 TABLET | Refills: 5 | Status: SHIPPED | OUTPATIENT
Start: 2022-06-21 | End: 2022-12-23

## 2022-06-21 RX ORDER — CLOPIDOGREL BISULFATE 75 MG/1
75 TABLET ORAL DAILY
Qty: 30 TABLET | Refills: 0 | Status: ON HOLD | OUTPATIENT
Start: 2022-06-21 | End: 2022-07-21 | Stop reason: SDUPTHER

## 2022-07-18 ENCOUNTER — TELEPHONE (OUTPATIENT)
Dept: SCHEDULING | Age: 47
End: 2022-07-18

## 2022-07-18 ENCOUNTER — LAB SERVICES (OUTPATIENT)
Dept: URGENT CARE | Age: 47
End: 2022-07-18

## 2022-07-18 DIAGNOSIS — Z01.812 PRE-PROCEDURAL LABORATORY EXAMINATION: Primary | ICD-10-CM

## 2022-07-18 PROCEDURE — U0003 INFECTIOUS AGENT DETECTION BY NUCLEIC ACID (DNA OR RNA); SEVERE ACUTE RESPIRATORY SYNDROME CORONAVIRUS 2 (SARS-COV-2) (CORONAVIRUS DISEASE [COVID-19]), AMPLIFIED PROBE TECHNIQUE, MAKING USE OF HIGH THROUGHPUT TECHNOLOGIES AS DESCRIBED BY CMS-2020-01-R: HCPCS | Performed by: INTERNAL MEDICINE

## 2022-07-18 PROCEDURE — U0005 INFEC AGEN DETEC AMPLI PROBE: HCPCS | Performed by: INTERNAL MEDICINE

## 2022-07-19 ENCOUNTER — PREP FOR CASE (OUTPATIENT)
Dept: INTERVENTIONAL RADIOLOGY/VASCULAR | Age: 47
End: 2022-07-19

## 2022-07-19 LAB
SARS-COV-2 RNA RESP QL NAA+PROBE: NOT DETECTED
SERVICE CMNT-IMP: NORMAL
SERVICE CMNT-IMP: NORMAL

## 2022-07-19 RX ORDER — 0.9 % SODIUM CHLORIDE 0.9 %
2 VIAL (ML) INJECTION EVERY 12 HOURS SCHEDULED
Status: CANCELLED | OUTPATIENT
Start: 2022-07-19

## 2022-07-19 RX ORDER — SODIUM CHLORIDE 9 MG/ML
INJECTION, SOLUTION INTRAVENOUS CONTINUOUS
Status: CANCELLED | OUTPATIENT
Start: 2022-07-19

## 2022-07-20 ENCOUNTER — ANESTHESIA EVENT (OUTPATIENT)
Dept: INTERVENTIONAL RADIOLOGY/VASCULAR | Age: 47
DRG: 026 | End: 2022-07-20

## 2022-07-20 ENCOUNTER — HOSPITAL ENCOUNTER (INPATIENT)
Dept: INTERVENTIONAL RADIOLOGY/VASCULAR | Age: 47
LOS: 1 days | Discharge: HOME | DRG: 026 | End: 2022-07-21
Attending: NURSE PRACTITIONER | Admitting: RADIOLOGY

## 2022-07-20 ENCOUNTER — ANESTHESIA (OUTPATIENT)
Dept: INTERVENTIONAL RADIOLOGY/VASCULAR | Age: 47
DRG: 026 | End: 2022-07-20

## 2022-07-20 ENCOUNTER — APPOINTMENT (OUTPATIENT)
Dept: NEUROLOGY | Age: 47
DRG: 026 | End: 2022-07-20
Attending: RADIOLOGY

## 2022-07-20 DIAGNOSIS — I72.9 PSEUDOANEURYSM (CMD): ICD-10-CM

## 2022-07-20 LAB
ABO + RH BLD: NORMAL
BLD GP AB SCN SERPL QL GEL: NEGATIVE
TYPE AND SCREEN EXPIRATION DATE: NORMAL

## 2022-07-20 PROCEDURE — 36227 PLACE CATH XTRNL CAROTID: CPT

## 2022-07-20 PROCEDURE — 10006023 HB SUPPLY 272

## 2022-07-20 PROCEDURE — X1094 NO CHARGE VISIT: HCPCS | Performed by: STUDENT IN AN ORGANIZED HEALTH CARE EDUCATION/TRAINING PROGRAM

## 2022-07-20 PROCEDURE — C1769 GUIDE WIRE: HCPCS

## 2022-07-20 PROCEDURE — 36226 PLACE CATH VERTEBRAL ART: CPT

## 2022-07-20 PROCEDURE — 10002803 HB RX 637: Performed by: ANESTHESIOLOGY

## 2022-07-20 PROCEDURE — C1887 CATHETER, GUIDING: HCPCS

## 2022-07-20 PROCEDURE — 95955 EEG DURING SURGERY: CPT | Performed by: PSYCHIATRY & NEUROLOGY

## 2022-07-20 PROCEDURE — 10004651 HB RX, NO CHARGE ITEM: Performed by: ANESTHESIOLOGY

## 2022-07-20 PROCEDURE — 86901 BLOOD TYPING SEROLOGIC RH(D): CPT | Performed by: STUDENT IN AN ORGANIZED HEALTH CARE EDUCATION/TRAINING PROGRAM

## 2022-07-20 PROCEDURE — 10002801 HB RX 250 W/O HCPCS: Performed by: ANESTHESIOLOGY

## 2022-07-20 PROCEDURE — C1894 INTRO/SHEATH, NON-LASER: HCPCS

## 2022-07-20 PROCEDURE — B41F1ZZ FLUOROSCOPY OF RIGHT LOWER EXTREMITY ARTERIES USING LOW OSMOLAR CONTRAST: ICD-10-PCS | Performed by: RADIOLOGY

## 2022-07-20 PROCEDURE — 37216 TRANSCATH STENT CCA W/O EPS: CPT

## 2022-07-20 PROCEDURE — B3151ZZ FLUOROSCOPY OF BILATERAL COMMON CAROTID ARTERIES USING LOW OSMOLAR CONTRAST: ICD-10-PCS | Performed by: RADIOLOGY

## 2022-07-20 PROCEDURE — 03VG3DZ RESTRICTION OF INTRACRANIAL ARTERY WITH INTRALUMINAL DEVICE, PERCUTANEOUS APPROACH: ICD-10-PCS | Performed by: RADIOLOGY

## 2022-07-20 PROCEDURE — 10006027 HB SUPPLY 278

## 2022-07-20 PROCEDURE — B31G1ZZ FLUOROSCOPY OF BILATERAL VERTEBRAL ARTERIES USING LOW OSMOLAR CONTRAST: ICD-10-PCS | Performed by: RADIOLOGY

## 2022-07-20 PROCEDURE — 95955 EEG DURING SURGERY: CPT

## 2022-07-20 PROCEDURE — 10002805 HB CONTRAST AGENT: Performed by: NURSE PRACTITIONER

## 2022-07-20 PROCEDURE — 37216 TRANSCATH STENT CCA W/O EPS: CPT | Performed by: RADIOLOGY

## 2022-07-20 PROCEDURE — 10003585 HB ROOM CHARGE INTERMEDIATE CARE

## 2022-07-20 PROCEDURE — 37215 TRANSCATH STENT CCA W/EPS: CPT

## 2022-07-20 PROCEDURE — B31C1ZZ FLUOROSCOPY OF BILATERAL EXTERNAL CAROTID ARTERIES USING LOW OSMOLAR CONTRAST: ICD-10-PCS | Performed by: RADIOLOGY

## 2022-07-20 PROCEDURE — 10004651 HB RX, NO CHARGE ITEM: Performed by: NURSE PRACTITIONER

## 2022-07-20 PROCEDURE — G0269 OCCLUSIVE DEVICE IN VEIN ART: HCPCS

## 2022-07-20 PROCEDURE — B3181ZZ FLUOROSCOPY OF BILATERAL INTERNAL CAROTID ARTERIES USING LOW OSMOLAR CONTRAST: ICD-10-PCS | Performed by: RADIOLOGY

## 2022-07-20 PROCEDURE — 10002800 HB RX 250 W HCPCS: Performed by: ANESTHESIOLOGY

## 2022-07-20 PROCEDURE — 10002807 HB RX 258: Performed by: ANESTHESIOLOGY

## 2022-07-20 PROCEDURE — 36224 PLACE CATH CAROTD ART: CPT

## 2022-07-20 PROCEDURE — C1876 STENT, NON-COA/NON-COV W/DEL: HCPCS

## 2022-07-20 PROCEDURE — 10002801 HB RX 250 W/O HCPCS: Performed by: NURSE PRACTITIONER

## 2022-07-20 PROCEDURE — C1760 CLOSURE DEV, VASC: HCPCS

## 2022-07-20 PROCEDURE — 13000008 HB ANESTHESIA MAC OUTSIDE OR

## 2022-07-20 RX ORDER — HYDROCODONE BITARTRATE AND ACETAMINOPHEN 5; 325 MG/1; MG/1
1 TABLET ORAL EVERY 4 HOURS PRN
Status: DISCONTINUED | OUTPATIENT
Start: 2022-07-20 | End: 2022-07-20 | Stop reason: HOSPADM

## 2022-07-20 RX ORDER — FLUOXETINE HYDROCHLORIDE 20 MG/1
40 CAPSULE ORAL DAILY
Status: DISCONTINUED | OUTPATIENT
Start: 2022-07-21 | End: 2022-07-21 | Stop reason: HOSPADM

## 2022-07-20 RX ORDER — DIAZEPAM 5 MG/1
5 TABLET ORAL EVERY 4 HOURS PRN
Status: DISCONTINUED | OUTPATIENT
Start: 2022-07-20 | End: 2022-07-21 | Stop reason: HOSPADM

## 2022-07-20 RX ORDER — VENLAFAXINE HYDROCHLORIDE 150 MG/1
150 CAPSULE, EXTENDED RELEASE ORAL
Status: DISCONTINUED | OUTPATIENT
Start: 2022-07-21 | End: 2022-07-21 | Stop reason: HOSPADM

## 2022-07-20 RX ORDER — ACETAMINOPHEN 650 MG/1
650 SUPPOSITORY RECTAL EVERY 4 HOURS PRN
Status: DISCONTINUED | OUTPATIENT
Start: 2022-07-20 | End: 2022-07-20 | Stop reason: HOSPADM

## 2022-07-20 RX ORDER — SODIUM CHLORIDE 9 MG/ML
INJECTION, SOLUTION INTRAVENOUS CONTINUOUS PRN
Status: DISCONTINUED | OUTPATIENT
Start: 2022-07-20 | End: 2022-07-20

## 2022-07-20 RX ORDER — HEPARIN SODIUM 1000 [USP'U]/ML
INJECTION, SOLUTION INTRAVENOUS; SUBCUTANEOUS PRN
Status: DISCONTINUED | OUTPATIENT
Start: 2022-07-20 | End: 2022-07-20

## 2022-07-20 RX ORDER — ASPIRIN 325 MG
325 TABLET ORAL DAILY
Status: DISCONTINUED | OUTPATIENT
Start: 2022-07-21 | End: 2022-07-21 | Stop reason: HOSPADM

## 2022-07-20 RX ORDER — 0.9 % SODIUM CHLORIDE 0.9 %
2 VIAL (ML) INJECTION EVERY 12 HOURS SCHEDULED
Status: DISCONTINUED | OUTPATIENT
Start: 2022-07-20 | End: 2022-07-20 | Stop reason: HOSPADM

## 2022-07-20 RX ORDER — CYCLOBENZAPRINE HCL 5 MG
10 TABLET ORAL 3 TIMES DAILY PRN
Status: DISCONTINUED | OUTPATIENT
Start: 2022-07-20 | End: 2022-07-21 | Stop reason: HOSPADM

## 2022-07-20 RX ORDER — NOREPINEPHRINE BITARTRATE 0.03 MG/ML
0-10 INJECTION, SOLUTION INTRAVENOUS CONTINUOUS
Status: DISCONTINUED | OUTPATIENT
Start: 2022-07-20 | End: 2022-07-21 | Stop reason: HOSPADM

## 2022-07-20 RX ORDER — PROCHLORPERAZINE EDISYLATE 5 MG/ML
5 INJECTION INTRAMUSCULAR; INTRAVENOUS EVERY 4 HOURS PRN
Status: DISCONTINUED | OUTPATIENT
Start: 2022-07-20 | End: 2022-07-20 | Stop reason: HOSPADM

## 2022-07-20 RX ORDER — HYDRALAZINE HYDROCHLORIDE 20 MG/ML
10 INJECTION INTRAMUSCULAR; INTRAVENOUS EVERY 4 HOURS PRN
Status: DISCONTINUED | OUTPATIENT
Start: 2022-07-20 | End: 2022-07-21 | Stop reason: HOSPADM

## 2022-07-20 RX ORDER — ONDANSETRON 2 MG/ML
4 INJECTION INTRAMUSCULAR; INTRAVENOUS 2 TIMES DAILY PRN
Status: DISCONTINUED | OUTPATIENT
Start: 2022-07-20 | End: 2022-07-20 | Stop reason: HOSPADM

## 2022-07-20 RX ORDER — ACETAMINOPHEN 325 MG/1
650 TABLET ORAL EVERY 4 HOURS PRN
Status: DISCONTINUED | OUTPATIENT
Start: 2022-07-20 | End: 2022-07-20 | Stop reason: HOSPADM

## 2022-07-20 RX ORDER — LIDOCAINE HYDROCHLORIDE 10 MG/ML
INJECTION, SOLUTION INFILTRATION; PERINEURAL PRN
Status: DISCONTINUED | OUTPATIENT
Start: 2022-07-20 | End: 2022-07-20

## 2022-07-20 RX ORDER — ACETAMINOPHEN 325 MG/1
650 TABLET ORAL EVERY 4 HOURS PRN
Status: DISCONTINUED | OUTPATIENT
Start: 2022-07-20 | End: 2022-07-21 | Stop reason: HOSPADM

## 2022-07-20 RX ORDER — SODIUM CHLORIDE 9 MG/ML
INJECTION, SOLUTION INTRAVENOUS CONTINUOUS
Status: DISCONTINUED | OUTPATIENT
Start: 2022-07-20 | End: 2022-07-20 | Stop reason: HOSPADM

## 2022-07-20 RX ORDER — IODIXANOL 320 MG/ML
70 INJECTION, SOLUTION INTRAVASCULAR ONCE
Status: COMPLETED | OUTPATIENT
Start: 2022-07-20 | End: 2022-07-20

## 2022-07-20 RX ORDER — ALPRAZOLAM 0.25 MG/1
0.5 TABLET ORAL EVERY 6 HOURS PRN
Status: DISCONTINUED | OUTPATIENT
Start: 2022-07-20 | End: 2022-07-21 | Stop reason: HOSPADM

## 2022-07-20 RX ORDER — LIDOCAINE HYDROCHLORIDE 10 MG/ML
10 INJECTION, SOLUTION INFILTRATION; PERINEURAL ONCE
Status: COMPLETED | OUTPATIENT
Start: 2022-07-20 | End: 2022-07-20

## 2022-07-20 RX ORDER — CLOPIDOGREL BISULFATE 75 MG/1
75 TABLET ORAL DAILY
Status: DISCONTINUED | OUTPATIENT
Start: 2022-07-21 | End: 2022-07-21 | Stop reason: HOSPADM

## 2022-07-20 RX ADMIN — LIDOCAINE HYDROCHLORIDE 20 MG: 10 INJECTION, SOLUTION INFILTRATION; PERINEURAL at 09:55

## 2022-07-20 RX ADMIN — LIDOCAINE HYDROCHLORIDE 100 MG: 10 INJECTION, SOLUTION INFILTRATION; PERINEURAL at 10:17

## 2022-07-20 RX ADMIN — ALPRAZOLAM 0.5 MG: 0.25 TABLET ORAL at 21:29

## 2022-07-20 RX ADMIN — IODIXANOL 70 ML: 320 INJECTION, SOLUTION INTRAVASCULAR at 10:39

## 2022-07-20 RX ADMIN — SODIUM CHLORIDE: 9 INJECTION, SOLUTION INTRAVENOUS at 09:52

## 2022-07-20 RX ADMIN — HEPARIN SODIUM 5000 UNITS: 1000 INJECTION, SOLUTION INTRAVENOUS; SUBCUTANEOUS at 10:22

## 2022-07-20 RX ADMIN — PROPOFOL 150 MCG/KG/MIN: 10 INJECTION, EMULSION INTRAVENOUS at 09:55

## 2022-07-20 RX ADMIN — ACETAMINOPHEN 650 MG: 325 TABLET ORAL at 21:29

## 2022-07-20 RX ADMIN — CYCLOBENZAPRINE HYDROCHLORIDE 10 MG: 10 TABLET, FILM COATED ORAL at 15:25

## 2022-07-20 RX ADMIN — ACETAMINOPHEN 650 MG: 325 TABLET ORAL at 18:28

## 2022-07-20 RX ADMIN — HYDROCODONE BITARTRATE AND ACETAMINOPHEN 1 TABLET: 5; 325 TABLET ORAL at 13:22

## 2022-07-20 ASSESSMENT — PAIN DESCRIPTION - PAIN TYPE
TYPE: CHRONIC PAIN
TYPE: ACUTE PAIN
TYPE: CHRONIC PAIN
TYPE: ACUTE PAIN

## 2022-07-20 ASSESSMENT — ACTIVITIES OF DAILY LIVING (ADL)
ADL_SCORE: 12
CHRONIC_PAIN_PRESENT: NO
ADL_BEFORE_ADMISSION: INDEPENDENT
RECENT_DECLINE_ADL: NO
ADL_SHORT_OF_BREATH: NO

## 2022-07-20 ASSESSMENT — PATIENT HEALTH QUESTIONNAIRE - PHQ9
1. LITTLE INTEREST OR PLEASURE IN DOING THINGS: NOT AT ALL
CLINICAL INTERPRETATION OF PHQ2 SCORE: NO FURTHER SCREENING NEEDED
SUM OF ALL RESPONSES TO PHQ9 QUESTIONS 1 AND 2: 0
IS PATIENT ABLE TO COMPLETE PHQ2 OR PHQ9: YES
SUM OF ALL RESPONSES TO PHQ9 QUESTIONS 1 AND 2: 0
2. FEELING DOWN, DEPRESSED OR HOPELESS: NOT AT ALL

## 2022-07-20 ASSESSMENT — PAIN SCALES - WONG BAKER
WONGBAKER_NUMERICALRESPONSE: 3
WONGBAKER_NUMERICALRESPONSE: 2

## 2022-07-20 ASSESSMENT — MOVEMENT AND STRENGTH ASSESSMENTS
RUE_ASSESSMENT: NORMAL/COMPLETE ROM AGAINST GRAVITY WITH FULL RESISTANCE
LUE_ASSESSMENT: NORMAL/COMPLETE ROM AGAINST GRAVITY WITH FULL RESISTANCE
RLE_ASSESSMENT: GOOD/COMPLETE ROM AGAINST GRAVITY, SOME ADDED RESISTANCE
LLE_ASSESSMENT: NORMAL/COMPLETE ROM AGAINST GRAVITY WITH FULL RESISTANCE
LLE_ASSESSMENT: NORMAL/COMPLETE ROM AGAINST GRAVITY WITH FULL RESISTANCE
RUE_ASSESSMENT: NORMAL/COMPLETE ROM AGAINST GRAVITY WITH FULL RESISTANCE
RLE_ASSESSMENT: GOOD/COMPLETE ROM AGAINST GRAVITY, SOME ADDED RESISTANCE
LUE_ASSESSMENT: NORMAL/COMPLETE ROM AGAINST GRAVITY WITH FULL RESISTANCE

## 2022-07-20 ASSESSMENT — COGNITIVE AND FUNCTIONAL STATUS - GENERAL
DO YOU HAVE DIFFICULTY DRESSING OR BATHING: NO
BECAUSE OF A PHYSICAL, MENTAL, OR EMOTIONAL CONDITION, DO YOU HAVE SERIOUS DIFFICULTY CONCENTRATING, REMEMBERING OR MAKING DECISIONS: NO
DO YOU HAVE SERIOUS DIFFICULTY WALKING OR CLIMBING STAIRS: NO
BECAUSE OF A PHYSICAL, MENTAL, OR EMOTIONAL CONDITION, DO YOU HAVE DIFFICULTY DOING ERRANDS ALONE: NO
DO YOU HAVE DIFFICULTY DRESSING OR BATHING: NO
ARE YOU DEAF OR DO YOU HAVE SERIOUS DIFFICULTY  HEARING: NO
DO YOU HAVE SERIOUS DIFFICULTY WALKING OR CLIMBING STAIRS: NO
BECAUSE OF A PHYSICAL, MENTAL, OR EMOTIONAL CONDITION, DO YOU HAVE SERIOUS DIFFICULTY CONCENTRATING, REMEMBERING OR MAKING DECISIONS: NO
ARE YOU BLIND OR DO YOU HAVE SERIOUS DIFFICULTY SEEING, EVEN WHEN WEARING GLASSES: NO
BECAUSE OF A PHYSICAL, MENTAL, OR EMOTIONAL CONDITION, DO YOU HAVE DIFFICULTY DOING ERRANDS ALONE: NO

## 2022-07-20 ASSESSMENT — COLUMBIA-SUICIDE SEVERITY RATING SCALE - C-SSRS
6. HAVE YOU EVER DONE ANYTHING, STARTED TO DO ANYTHING, OR PREPARED TO DO ANYTHING TO END YOUR LIFE?: NO
IS THE PATIENT ABLE TO COMPLETE C-SSRS: YES
2. HAVE YOU ACTUALLY HAD ANY THOUGHTS OF KILLING YOURSELF?: NO
1. WITHIN THE PAST MONTH, HAVE YOU WISHED YOU WERE DEAD OR WISHED YOU COULD GO TO SLEEP AND NOT WAKE UP?: NO

## 2022-07-20 ASSESSMENT — PAIN SCALES - GENERAL
PAINLEVEL_OUTOF10: 2
PAINLEVEL_OUTOF10: 0
PAINLEVEL_OUTOF10: 3
PAINLEVEL_OUTOF10: 3
PAINLEVEL_OUTOF10: 4
PAINLEVEL_OUTOF10: 0
PAINLEVEL_OUTOF10: 3
PAINLEVEL_OUTOF10: 3
PAINLEVEL_OUTOF10: 0
PAINLEVEL_OUTOF10: 4
PAINLEVEL_OUTOF10: 0
PAINLEVEL_OUTOF10: 3
PAINLEVEL_OUTOF10: 2
PAINLEVEL_OUTOF10: 5
PAINLEVEL_OUTOF10: 2
PAINLEVEL_OUTOF10: 4
PAINLEVEL_OUTOF10: 4

## 2022-07-20 ASSESSMENT — ENCOUNTER SYMPTOMS: HEADACHES: 1

## 2022-07-20 ASSESSMENT — LIFESTYLE VARIABLES
HOW OFTEN DO YOU HAVE 6 OR MORE DRINKS ON ONE OCCASION: NEVER
HOW OFTEN DO YOU HAVE A DRINK CONTAINING ALCOHOL: NEVER
ALCOHOL_USE_STATUS: NO OR LOW RISK WITH VALIDATED TOOL

## 2022-07-21 ENCOUNTER — APPOINTMENT (OUTPATIENT)
Dept: VASCULAR LAB | Age: 47
DRG: 026 | End: 2022-07-21
Attending: INTERNAL MEDICINE

## 2022-07-21 VITALS
DIASTOLIC BLOOD PRESSURE: 70 MMHG | HEIGHT: 69 IN | OXYGEN SATURATION: 100 % | TEMPERATURE: 98.1 F | HEART RATE: 80 BPM | BODY MASS INDEX: 21.48 KG/M2 | SYSTOLIC BLOOD PRESSURE: 97 MMHG | RESPIRATION RATE: 13 BRPM | WEIGHT: 145 LBS

## 2022-07-21 LAB
ALBUMIN SERPL-MCNC: 3.4 G/DL (ref 3.6–5.1)
ALBUMIN/GLOB SERPL: 1.2 {RATIO} (ref 1–2.4)
ALP SERPL-CCNC: 80 UNITS/L (ref 45–117)
ALT SERPL-CCNC: 22 UNITS/L
ANION GAP SERPL CALC-SCNC: 11 MMOL/L (ref 7–19)
AST SERPL-CCNC: 21 UNITS/L
BILIRUB SERPL-MCNC: 0.4 MG/DL (ref 0.2–1)
BUN SERPL-MCNC: 11 MG/DL (ref 6–20)
BUN/CREAT SERPL: 14 (ref 7–25)
CALCIUM SERPL-MCNC: 9.1 MG/DL (ref 8.4–10.2)
CHLORIDE SERPL-SCNC: 108 MMOL/L (ref 97–110)
CO2 SERPL-SCNC: 26 MMOL/L (ref 21–32)
CREAT SERPL-MCNC: 0.8 MG/DL (ref 0.51–0.95)
DEPRECATED RDW RBC: 37.1 FL (ref 39–50)
ERYTHROCYTE [DISTWIDTH] IN BLOOD: 11.9 % (ref 11–15)
FASTING DURATION TIME PATIENT: ABNORMAL H
GFR SERPLBLD BASED ON 1.73 SQ M-ARVRAT: >90 ML/MIN
GLOBULIN SER-MCNC: 2.9 G/DL (ref 2–4)
GLUCOSE SERPL-MCNC: 98 MG/DL (ref 70–99)
HCT VFR BLD CALC: 33.8 % (ref 36–46.5)
HGB BLD-MCNC: 11.6 G/DL (ref 12–15.5)
MCH RBC QN AUTO: 29.6 PG (ref 26–34)
MCHC RBC AUTO-ENTMCNC: 34.3 G/DL (ref 32–36.5)
MCV RBC AUTO: 86.2 FL (ref 78–100)
NRBC BLD MANUAL-RTO: 0 /100 WBC
PLATELET # BLD AUTO: 274 K/MCL (ref 140–450)
POTASSIUM SERPL-SCNC: 3.7 MMOL/L (ref 3.4–5.1)
PROT SERPL-MCNC: 6.3 G/DL (ref 6.4–8.2)
RBC # BLD: 3.92 MIL/MCL (ref 4–5.2)
SODIUM SERPL-SCNC: 141 MMOL/L (ref 135–145)
WBC # BLD: 8.9 K/MCL (ref 4.2–11)

## 2022-07-21 PROCEDURE — 80053 COMPREHEN METABOLIC PANEL: CPT | Performed by: NURSE PRACTITIONER

## 2022-07-21 PROCEDURE — 85027 COMPLETE CBC AUTOMATED: CPT | Performed by: NURSE PRACTITIONER

## 2022-07-21 PROCEDURE — 10002803 HB RX 637: Performed by: STUDENT IN AN ORGANIZED HEALTH CARE EDUCATION/TRAINING PROGRAM

## 2022-07-21 PROCEDURE — 10004651 HB RX, NO CHARGE ITEM: Performed by: STUDENT IN AN ORGANIZED HEALTH CARE EDUCATION/TRAINING PROGRAM

## 2022-07-21 PROCEDURE — 10002803 HB RX 637: Performed by: NURSE PRACTITIONER

## 2022-07-21 PROCEDURE — 93926 LOWER EXTREMITY STUDY: CPT

## 2022-07-21 PROCEDURE — 10002803 HB RX 637: Performed by: INTERNAL MEDICINE

## 2022-07-21 PROCEDURE — 10002801 HB RX 250 W/O HCPCS: Performed by: NURSE PRACTITIONER

## 2022-07-21 PROCEDURE — 10002803 HB RX 637: Performed by: ANESTHESIOLOGY

## 2022-07-21 PROCEDURE — 10002803 HB RX 637: Performed by: RADIOLOGY

## 2022-07-21 RX ORDER — CLOPIDOGREL BISULFATE 75 MG/1
TABLET ORAL
Qty: 30 TABLET | Refills: 0 | Status: SHIPPED | OUTPATIENT
Start: 2022-07-21 | End: 2022-09-28 | Stop reason: ALTCHOICE

## 2022-07-21 RX ORDER — AMPICILLIN TRIHYDRATE 250 MG
1 CAPSULE ORAL PRN
Status: ON HOLD | COMMUNITY
End: 2022-07-21

## 2022-07-21 RX ORDER — FREMANEZUMAB-VFRM 225 MG/1.5ML
1.5 INJECTION SUBCUTANEOUS
COMMUNITY

## 2022-07-21 RX ORDER — CLOPIDOGREL BISULFATE 75 MG/1
75 TABLET ORAL DAILY
Qty: 30 TABLET | Refills: 0 | Status: SHIPPED | OUTPATIENT
Start: 2022-07-21 | End: 2022-07-21

## 2022-07-21 RX ORDER — HYDROCODONE BITARTRATE AND ACETAMINOPHEN 5; 325 MG/1; MG/1
1 TABLET ORAL ONCE
Status: COMPLETED | OUTPATIENT
Start: 2022-07-21 | End: 2022-07-21

## 2022-07-21 RX ORDER — PANTOPRAZOLE SODIUM 40 MG/1
40 TABLET, DELAYED RELEASE ORAL DAILY
Status: DISCONTINUED | OUTPATIENT
Start: 2022-07-21 | End: 2022-07-21 | Stop reason: HOSPADM

## 2022-07-21 RX ORDER — POTASSIUM CHLORIDE 20 MEQ/1
40 TABLET, EXTENDED RELEASE ORAL ONCE
Status: COMPLETED | OUTPATIENT
Start: 2022-07-21 | End: 2022-07-21

## 2022-07-21 RX ADMIN — POTASSIUM CHLORIDE 40 MEQ: 1500 TABLET, EXTENDED RELEASE ORAL at 05:46

## 2022-07-21 RX ADMIN — CLOPIDOGREL BISULFATE 75 MG: 75 TABLET, FILM COATED ORAL at 08:46

## 2022-07-21 RX ADMIN — VENLAFAXINE HYDROCHLORIDE 150 MG: 150 CAPSULE, EXTENDED RELEASE ORAL at 09:12

## 2022-07-21 RX ADMIN — HYDROCODONE BITARTRATE AND ACETAMINOPHEN 1 TABLET: 5; 325 TABLET ORAL at 00:51

## 2022-07-21 RX ADMIN — PANTOPRAZOLE SODIUM 40 MG: 40 TABLET, DELAYED RELEASE ORAL at 14:20

## 2022-07-21 RX ADMIN — Medication 1 MCG/MIN: at 00:18

## 2022-07-21 RX ADMIN — ASPIRIN 325 MG ORAL TABLET 325 MG: 325 PILL ORAL at 08:46

## 2022-07-21 ASSESSMENT — PAIN SCALES - WONG BAKER
WONGBAKER_NUMERICALRESPONSE: 2
WONGBAKER_NUMERICALRESPONSE: 2
WONGBAKER_NUMERICALRESPONSE: 5
WONGBAKER_NUMERICALRESPONSE: 0
WONGBAKER_NUMERICALRESPONSE: 0
WONGBAKER_NUMERICALRESPONSE: 5

## 2022-07-21 ASSESSMENT — MOVEMENT AND STRENGTH ASSESSMENTS
LLE_ASSESSMENT: NORMAL/COMPLETE ROM AGAINST GRAVITY WITH FULL RESISTANCE
RUE_ASSESSMENT: NORMAL/COMPLETE ROM AGAINST GRAVITY WITH FULL RESISTANCE
LUE_ASSESSMENT: NORMAL/COMPLETE ROM AGAINST GRAVITY WITH FULL RESISTANCE
LLE_ASSESSMENT: NORMAL/COMPLETE ROM AGAINST GRAVITY WITH FULL RESISTANCE
RLE_ASSESSMENT: GOOD/COMPLETE ROM AGAINST GRAVITY, SOME ADDED RESISTANCE
RLE_ASSESSMENT: GOOD/COMPLETE ROM AGAINST GRAVITY, SOME ADDED RESISTANCE
RUE_ASSESSMENT: NORMAL/COMPLETE ROM AGAINST GRAVITY WITH FULL RESISTANCE
RLE_ASSESSMENT: GOOD/COMPLETE ROM AGAINST GRAVITY, SOME ADDED RESISTANCE
LLE_ASSESSMENT: NORMAL/COMPLETE ROM AGAINST GRAVITY WITH FULL RESISTANCE
RUE_ASSESSMENT: NORMAL/COMPLETE ROM AGAINST GRAVITY WITH FULL RESISTANCE
LUE_ASSESSMENT: NORMAL/COMPLETE ROM AGAINST GRAVITY WITH FULL RESISTANCE
RLE_ASSESSMENT: GOOD/COMPLETE ROM AGAINST GRAVITY, SOME ADDED RESISTANCE
RUE_ASSESSMENT: NORMAL/COMPLETE ROM AGAINST GRAVITY WITH FULL RESISTANCE
LUE_ASSESSMENT: NORMAL/COMPLETE ROM AGAINST GRAVITY WITH FULL RESISTANCE
LLE_ASSESSMENT: NORMAL/COMPLETE ROM AGAINST GRAVITY WITH FULL RESISTANCE
LUE_ASSESSMENT: NORMAL/COMPLETE ROM AGAINST GRAVITY WITH FULL RESISTANCE

## 2022-07-21 ASSESSMENT — PAIN SCALES - GENERAL
PAINLEVEL_OUTOF10: 0
PAINLEVEL_OUTOF10: 2
PAINLEVEL_OUTOF10: 2
PAINLEVEL_OUTOF10: 5
PAINLEVEL_OUTOF10: 0
PAINLEVEL_OUTOF10: 5

## 2022-08-02 ENCOUNTER — OFFICE VISIT (OUTPATIENT)
Dept: NEUROSURGERY | Age: 47
End: 2022-08-02

## 2022-08-02 VITALS
WEIGHT: 145 LBS | BODY MASS INDEX: 21.48 KG/M2 | HEART RATE: 90 BPM | DIASTOLIC BLOOD PRESSURE: 63 MMHG | SYSTOLIC BLOOD PRESSURE: 95 MMHG | HEIGHT: 69 IN | TEMPERATURE: 98.3 F

## 2022-08-02 DIAGNOSIS — I72.0 CAROTID PSEUDOANEURYSM (CMD): Primary | ICD-10-CM

## 2022-08-02 PROCEDURE — 99213 OFFICE O/P EST LOW 20 MIN: CPT | Performed by: NURSE PRACTITIONER

## 2022-08-02 ASSESSMENT — ENCOUNTER SYMPTOMS
SINUS PAIN: 0
SINUS PRESSURE: 0
HEADACHES: 0
CHEST TIGHTNESS: 0
FEVER: 0
SHORTNESS OF BREATH: 0
FATIGUE: 0
COUGH: 0
GASTROINTESTINAL NEGATIVE: 1
ACTIVITY CHANGE: 0
BRUISES/BLEEDS EASILY: 0
SPEECH DIFFICULTY: 0
WEAKNESS: 0
DIZZINESS: 0

## 2022-08-30 ENCOUNTER — APPOINTMENT (OUTPATIENT)
Dept: MAMMOGRAPHY | Age: 47
End: 2022-08-30
Attending: OBSTETRICS & GYNECOLOGY

## 2022-09-06 ENCOUNTER — HOSPITAL ENCOUNTER (OUTPATIENT)
Dept: MAMMOGRAPHY | Age: 47
Discharge: HOME | End: 2022-09-06

## 2022-09-06 DIAGNOSIS — Z12.31 VISIT FOR SCREENING MAMMOGRAM: ICD-10-CM

## 2022-09-06 PROCEDURE — 77063 BREAST TOMOSYNTHESIS BI: CPT

## 2022-09-28 ENCOUNTER — OFFICE VISIT (OUTPATIENT)
Dept: OBGYN | Age: 47
End: 2022-09-28

## 2022-09-28 VITALS
BODY MASS INDEX: 22.36 KG/M2 | WEIGHT: 151 LBS | DIASTOLIC BLOOD PRESSURE: 64 MMHG | SYSTOLIC BLOOD PRESSURE: 98 MMHG | HEIGHT: 69 IN

## 2022-09-28 DIAGNOSIS — N95.2 ATROPHIC VAGINITIS: ICD-10-CM

## 2022-09-28 DIAGNOSIS — Z01.419 WELL WOMAN EXAM WITH ROUTINE GYNECOLOGICAL EXAM: Primary | ICD-10-CM

## 2022-09-28 PROBLEM — I72.9 PSEUDOANEURYSM (CMD): Status: ACTIVE | Noted: 2021-08-04

## 2022-09-28 PROCEDURE — 99396 PREV VISIT EST AGE 40-64: CPT | Performed by: OBSTETRICS & GYNECOLOGY

## 2022-09-28 RX ORDER — MECLIZINE HYDROCHLORIDE 25 MG/1
TABLET ORAL
COMMUNITY
Start: 2022-09-20

## 2022-09-28 RX ORDER — ESTRADIOL 4 UG/1
4 INSERT VAGINAL
Qty: 8 EACH | Refills: 6 | Status: SHIPPED | OUTPATIENT
Start: 2022-10-03 | End: 2022-10-14 | Stop reason: ALTCHOICE

## 2022-09-28 RX ORDER — RIMEGEPANT SULFATE 75 MG/75MG
75 TABLET, ORALLY DISINTEGRATING ORAL DAILY PRN
COMMUNITY
Start: 2022-08-17

## 2022-09-28 RX ORDER — HYOSCYAMINE SULFATE 0.125 MG
TABLET ORAL
COMMUNITY
Start: 2022-09-20

## 2022-09-28 RX ORDER — ESTRADIOL 4 UG/1
4 INSERT VAGINAL DAILY
Qty: 14 EACH | Refills: 0 | Status: SHIPPED | OUTPATIENT
Start: 2022-09-28 | End: 2022-10-14 | Stop reason: ALTCHOICE

## 2022-09-28 RX ORDER — DIAZEPAM 10 MG/1
10 TABLET ORAL
COMMUNITY
Start: 2022-09-20

## 2022-09-28 RX ORDER — CYCLOBENZAPRINE HCL 10 MG
10 TABLET ORAL
COMMUNITY
Start: 2022-09-20

## 2022-10-14 DIAGNOSIS — N95.2 ATROPHIC VAGINITIS: ICD-10-CM

## 2022-10-14 RX ORDER — ESTRADIOL 4 UG/1
4 INSERT VAGINAL
Qty: 12 EACH | Refills: 3 | Status: SHIPPED | OUTPATIENT
Start: 2022-10-17 | End: 2023-10-02 | Stop reason: SDUPTHER

## 2022-12-05 ENCOUNTER — TELEPHONE (OUTPATIENT)
Dept: NEUROSURGERY | Age: 47
End: 2022-12-05

## 2022-12-09 DIAGNOSIS — I72.0 CAROTID PSEUDOANEURYSM (CMD): Primary | ICD-10-CM

## 2022-12-23 RX ORDER — DIPHENHYDRAMINE HCL 12.5MG/5ML
LIQUID (ML) ORAL
Qty: 90 TABLET | Refills: 1 | Status: SHIPPED | OUTPATIENT
Start: 2022-12-23 | End: 2023-10-02 | Stop reason: ALTCHOICE

## 2022-12-26 ENCOUNTER — HOSPITAL ENCOUNTER (OUTPATIENT)
Dept: MRI IMAGING | Age: 47
Discharge: HOME | End: 2022-12-26
Attending: NURSE PRACTITIONER

## 2022-12-26 DIAGNOSIS — I72.0 CAROTID PSEUDOANEURYSM (CMD): ICD-10-CM

## 2022-12-26 PROCEDURE — 10002805 HB CONTRAST AGENT: Performed by: NURSE PRACTITIONER

## 2022-12-26 PROCEDURE — 70549 MR ANGIOGRAPH NECK W/O&W/DYE: CPT

## 2022-12-26 PROCEDURE — A9585 GADOBUTROL INJECTION: HCPCS | Performed by: NURSE PRACTITIONER

## 2022-12-26 PROCEDURE — G1004 CDSM NDSC: HCPCS

## 2022-12-26 RX ORDER — GADOBUTROL 604.72 MG/ML
7.5 INJECTION INTRAVENOUS ONCE
Status: COMPLETED | OUTPATIENT
Start: 2022-12-26 | End: 2022-12-26

## 2022-12-26 RX ADMIN — GADOBUTROL 7.5 ML: 604.72 INJECTION INTRAVENOUS at 12:20

## 2023-01-17 ENCOUNTER — TELEPHONE (OUTPATIENT)
Dept: INTERVENTIONAL RADIOLOGY/VASCULAR | Age: 48
End: 2023-01-17

## 2023-02-06 ENCOUNTER — APPOINTMENT (OUTPATIENT)
Dept: NEUROSURGERY | Age: 48
End: 2023-02-06

## 2023-04-17 DIAGNOSIS — R52 PAIN: Primary | ICD-10-CM

## 2023-05-04 ENCOUNTER — HOSPITAL ENCOUNTER (OUTPATIENT)
Dept: ULTRASOUND IMAGING | Age: 48
Discharge: HOME | End: 2023-05-04
Attending: NURSE PRACTITIONER

## 2023-05-04 DIAGNOSIS — R52 PAIN: ICD-10-CM

## 2023-05-04 PROCEDURE — 93926 LOWER EXTREMITY STUDY: CPT

## 2023-05-08 ENCOUNTER — TELEPHONE (OUTPATIENT)
Dept: SURGERY | Age: 48
End: 2023-05-08

## 2023-07-07 ENCOUNTER — TELEPHONE (OUTPATIENT)
Dept: OBGYN | Age: 48
End: 2023-07-07

## 2023-07-07 DIAGNOSIS — Z12.31 ENCOUNTER FOR SCREENING MAMMOGRAM FOR MALIGNANT NEOPLASM OF BREAST: Primary | ICD-10-CM

## 2023-09-11 ENCOUNTER — HOSPITAL ENCOUNTER (OUTPATIENT)
Dept: MAMMOGRAPHY | Age: 48
Discharge: HOME | End: 2023-09-11
Attending: OBSTETRICS & GYNECOLOGY

## 2023-09-11 DIAGNOSIS — Z12.31 ENCOUNTER FOR SCREENING MAMMOGRAM FOR MALIGNANT NEOPLASM OF BREAST: ICD-10-CM

## 2023-09-11 PROCEDURE — 77063 BREAST TOMOSYNTHESIS BI: CPT

## 2023-10-02 ENCOUNTER — OFFICE VISIT (OUTPATIENT)
Dept: OBGYN | Age: 48
End: 2023-10-02

## 2023-10-02 VITALS
DIASTOLIC BLOOD PRESSURE: 57 MMHG | HEIGHT: 69 IN | BODY MASS INDEX: 22.38 KG/M2 | SYSTOLIC BLOOD PRESSURE: 82 MMHG | WEIGHT: 151.13 LBS | HEART RATE: 92 BPM

## 2023-10-02 DIAGNOSIS — Z01.419 WELL WOMAN EXAM WITH ROUTINE GYNECOLOGICAL EXAM: Primary | ICD-10-CM

## 2023-10-02 DIAGNOSIS — N95.2 ATROPHIC VAGINITIS: ICD-10-CM

## 2023-10-02 DIAGNOSIS — Z11.51 SCREENING FOR HPV (HUMAN PAPILLOMAVIRUS): ICD-10-CM

## 2023-10-02 DIAGNOSIS — Z78.0 MENOPAUSE: ICD-10-CM

## 2023-10-02 PROCEDURE — 87624 HPV HI-RISK TYP POOLED RSLT: CPT | Performed by: CLINICAL MEDICAL LABORATORY

## 2023-10-02 PROCEDURE — 88175 CYTOPATH C/V AUTO FLUID REDO: CPT | Performed by: CLINICAL MEDICAL LABORATORY

## 2023-10-02 PROCEDURE — 99396 PREV VISIT EST AGE 40-64: CPT | Performed by: OBSTETRICS & GYNECOLOGY

## 2023-10-02 RX ORDER — ASPIRIN 81 MG/1
TABLET, CHEWABLE ORAL
COMMUNITY

## 2023-10-02 RX ORDER — ONDANSETRON 4 MG/1
1 TABLET, ORALLY DISINTEGRATING ORAL EVERY 8 HOURS PRN
COMMUNITY

## 2023-10-02 RX ORDER — CYCLOBENZAPRINE HCL 10 MG
1 TABLET ORAL 3 TIMES DAILY PRN
COMMUNITY
Start: 2023-06-21 | End: 2023-10-02 | Stop reason: SDUPTHER

## 2023-10-02 RX ORDER — ESTRADIOL 4 UG/1
4 INSERT VAGINAL
Qty: 12 EACH | Refills: 3 | Status: SHIPPED | OUTPATIENT
Start: 2023-10-02

## 2023-10-02 RX ORDER — RIZATRIPTAN BENZOATE 10 MG/1
TABLET, ORALLY DISINTEGRATING ORAL
COMMUNITY

## 2023-10-02 ASSESSMENT — PAIN SCALES - GENERAL: PAINLEVEL: 0

## 2023-10-06 LAB
CASE RPRT: NORMAL
CLINICAL INFO: NORMAL
CYTOLOGY CVX/VAG STUDY: NORMAL
HPV16+18+45 E6+E7MRNA CVX NAA+PROBE: NEGATIVE
Lab: NORMAL
PAP EDUCATIONAL NOTE: NORMAL
SPECIMEN ADEQUACY: NORMAL

## 2023-10-25 DIAGNOSIS — N95.2 ATROPHIC VAGINITIS: ICD-10-CM

## 2023-10-25 RX ORDER — ESTRADIOL 4 UG/1
4 INSERT VAGINAL
Qty: 24 EACH | Refills: 2 | OUTPATIENT
Start: 2023-10-25

## 2023-11-06 ENCOUNTER — HOSPITAL ENCOUNTER (OUTPATIENT)
Dept: GENERAL RADIOLOGY | Age: 48
Discharge: HOME | End: 2023-11-06
Attending: OBSTETRICS & GYNECOLOGY

## 2023-11-06 DIAGNOSIS — Z78.0 MENOPAUSE: ICD-10-CM

## 2023-11-06 PROCEDURE — 77080 DXA BONE DENSITY AXIAL: CPT

## 2024-08-15 DIAGNOSIS — N95.2 ATROPHIC VAGINITIS: ICD-10-CM

## 2024-08-15 RX ORDER — ESTRADIOL 4 UG/1
4 INSERT VAGINAL
Qty: 16 EACH | Refills: 1 | OUTPATIENT
Start: 2024-08-15

## 2024-08-20 ENCOUNTER — TELEPHONE (OUTPATIENT)
Dept: OBGYN | Age: 49
End: 2024-08-20

## 2024-08-20 DIAGNOSIS — Z12.31 ENCOUNTER FOR SCREENING MAMMOGRAM FOR MALIGNANT NEOPLASM OF BREAST: Primary | ICD-10-CM

## 2024-09-11 ENCOUNTER — HOSPITAL ENCOUNTER (OUTPATIENT)
Dept: CT IMAGING | Age: 49
Discharge: HOME | End: 2024-09-11
Attending: OBSTETRICS & GYNECOLOGY

## 2024-09-11 DIAGNOSIS — Z12.31 ENCOUNTER FOR SCREENING MAMMOGRAM FOR MALIGNANT NEOPLASM OF BREAST: ICD-10-CM

## 2024-09-11 PROCEDURE — 77067 SCR MAMMO BI INCL CAD: CPT

## 2024-10-03 ENCOUNTER — APPOINTMENT (OUTPATIENT)
Dept: OBGYN | Age: 49
End: 2024-10-03

## 2024-10-03 VITALS
WEIGHT: 147 LBS | HEIGHT: 69 IN | DIASTOLIC BLOOD PRESSURE: 60 MMHG | SYSTOLIC BLOOD PRESSURE: 84 MMHG | BODY MASS INDEX: 21.77 KG/M2

## 2024-10-03 DIAGNOSIS — Z01.419 WELL WOMAN EXAM WITH ROUTINE GYNECOLOGICAL EXAM: Primary | ICD-10-CM

## 2024-10-03 DIAGNOSIS — Z78.0 MENOPAUSE: ICD-10-CM

## 2024-10-03 DIAGNOSIS — Z23 NEED FOR INFLUENZA VACCINATION: ICD-10-CM

## 2024-10-03 DIAGNOSIS — N95.2 ATROPHIC VAGINITIS: ICD-10-CM

## 2024-10-03 RX ORDER — ONDANSETRON 8 MG/1
TABLET, ORALLY DISINTEGRATING ORAL
COMMUNITY

## 2024-10-03 RX ORDER — ERGOCALCIFEROL 1.25 MG/1
CAPSULE ORAL
COMMUNITY
Start: 2024-09-05

## 2024-10-03 RX ORDER — ESTRADIOL 2 MG/1
1 RING VAGINAL ONCE
Qty: 3 EACH | Refills: 0 | Status: SHIPPED | OUTPATIENT
Start: 2024-10-03 | End: 2024-10-03

## 2024-10-03 RX ORDER — ESTRADIOL 0.1 MG/G
CREAM VAGINAL
Qty: 42.5 G | Refills: 12 | Status: SHIPPED | OUTPATIENT
Start: 2024-10-03

## 2024-10-03 RX ORDER — GABAPENTIN 100 MG/1
100 CAPSULE ORAL 3 TIMES DAILY
COMMUNITY
Start: 2024-07-05

## 2024-10-03 RX ORDER — ELETRIPTAN HYDROBROMIDE 40 MG/1
TABLET, FILM COATED ORAL
COMMUNITY
Start: 2024-08-12

## 2024-10-03 SDOH — ECONOMIC STABILITY: FOOD INSECURITY: WITHIN THE PAST 12 MONTHS, THE FOOD YOU BOUGHT JUST DIDN'T LAST AND YOU DIDN'T HAVE MONEY TO GET MORE.: NEVER TRUE

## 2024-10-03 SDOH — ECONOMIC STABILITY: HOUSING INSECURITY: DO YOU HAVE PROBLEMS WITH ANY OF THE FOLLOWING?: NONE OF THE ABOVE

## 2024-10-03 SDOH — ECONOMIC STABILITY: TRANSPORTATION INSECURITY
IN THE PAST 12 MONTHS, HAS LACK OF RELIABLE TRANSPORTATION KEPT YOU FROM MEDICAL APPOINTMENTS, MEETINGS, WORK OR FROM GETTING THINGS NEEDED FOR DAILY LIVING?: NO

## 2024-10-03 SDOH — ECONOMIC STABILITY: GENERAL: WOULD YOU LIKE HELP WITH ANY OF THE FOLLOWING NEEDS?: I DON'T WANT HELP WITH ANY OF THESE

## 2024-10-03 SDOH — ECONOMIC STABILITY: HOUSING INSECURITY: WHAT IS YOUR LIVING SITUATION TODAY?: I HAVE A STEADY PLACE TO LIVE

## 2024-10-03 ASSESSMENT — SOCIAL DETERMINANTS OF HEALTH (SDOH): IN THE PAST 12 MONTHS, HAS THE ELECTRIC, GAS, OIL, OR WATER COMPANY THREATENED TO SHUT OFF SERVICE IN YOUR HOME?: NO

## 2024-12-16 ENCOUNTER — TELEPHONE (OUTPATIENT)
Dept: PHYSICAL MEDICINE AND REHAB | Facility: CLINIC | Age: 49
End: 2024-12-16

## 2024-12-19 NOTE — TELEPHONE ENCOUNTER
MRI of Lumbar spine report placed in Dr Henderson's office for review. A copy of the report placed for scanning.

## 2024-12-20 ENCOUNTER — OFFICE VISIT (OUTPATIENT)
Dept: PHYSICAL MEDICINE AND REHAB | Facility: CLINIC | Age: 49
End: 2024-12-20
Payer: COMMERCIAL

## 2024-12-20 ENCOUNTER — TELEPHONE (OUTPATIENT)
Dept: PHYSICAL MEDICINE AND REHAB | Facility: CLINIC | Age: 49
End: 2024-12-20

## 2024-12-20 VITALS — WEIGHT: 145 LBS | BODY MASS INDEX: 21.48 KG/M2 | HEIGHT: 69 IN

## 2024-12-20 DIAGNOSIS — M47.816 LUMBAR SPONDYLOSIS: Primary | ICD-10-CM

## 2024-12-20 DIAGNOSIS — M54.16 LUMBAR RADICULOPATHY: Primary | ICD-10-CM

## 2024-12-20 PROCEDURE — 99204 OFFICE O/P NEW MOD 45 MIN: CPT | Performed by: PHYSICAL MEDICINE & REHABILITATION

## 2024-12-20 RX ORDER — MELOXICAM 15 MG/1
15 TABLET ORAL DAILY
Qty: 30 TABLET | Refills: 0 | Status: SHIPPED | OUTPATIENT
Start: 2024-12-20

## 2024-12-20 NOTE — PROGRESS NOTES
[facility]  Progress Note    CHIEF COMPLAINT:    Chief Complaint   Patient presents with    New Patient     New patient is here with complaints of low back pain and was referred by physical therapy. States the pain initially  started in 2008 but has gotten severe and progressively starting nov 1st. Reports intermittent n/t. States standing feel better than sitting. Takes ibuprofen and gabapentin. Completed physical therapy with Nuha.  States the pain radiates down throughout the let leg and down into her calf. Imaging was completed 12/13.  Pain 6/10       History of Present Illness:  Cristina Gonzalez is a 49 year old female who is being seen in consultation at the request of Nuha Crandall PT. She has a chief complaint of axial low back pain.  At 1 time this radiated into the posterior thigh but now radiation stays mostly in the gluteal and inguinal area.  The pain is primarily axial and bilateral.  Standing is better than sitting.    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 Repeat 2029.  Needs MAC    Special service or report       benign tumor of uterus - fibrothecoma     Special service or report       B. knee arthroscopy    Upper gi endoscopy - referral  3/2013    mild esophagitis.  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   Tobacco Use    Smoking status: Former     Current packs/day: 0.00     Average packs/day: 0.2 packs/day for 2.0 years (0.4 ttl pk-yrs)     Types: Cigarettes     Start date: 1993     Quit date: 1995     Years since quittin.4    Smokeless tobacco: Never    Tobacco comments:     was a social smoker till    Vaping Use    Vaping status: Never Used   Substance and Sexual Activity    Alcohol use: Yes     Alcohol/week: 0.0 - 1.0 standard drinks of alcohol     Comment: little     Drug use: No    Sexual activity: Yes     Partners: Male       CURRENT MEDICATIONS:   Current Outpatient Medications   Medication Sig Dispense Refill    Meloxicam 15 MG Oral Tab Take 1 tablet (15 mg total) by mouth daily. 30 tablet 0    nitrofurantoin monohydrate macro 100 MG Oral Cap Take 100 mg by mouth 2 (two) times daily.      Eletriptan Hydrobromide 40 MG Oral Tab 40 mg.      AJOVY 225 MG/1.5ML Subcutaneous Solution Auto-injector Inject 225 mg into the skin every 30 (thirty) days.      TOPIRAMATE 25 MG Oral Tab TAKE 1 TABLET (25 MG TOTAL) BY MOUTH EVERY MORNING AND 3 TABLETS (75 MG TOTAL) NIGHTLY. 360 tablet 3    venlafaxine 150 MG Oral Capsule SR 24 Hr       PANTOPRAZOLE 40 MG Oral Tab EC TAKE 1 TABLET (40 MG TOTAL) BY MOUTH 2 (TWO) TIMES DAILY BEFORE MEALS. 180 tablet 3    ubrogepant 100 MG Oral Tab Take 1 tablet PO at the onset of migraine.  May repeat 1 dose in 2 hours as needed.  Max 2 doses in 24 hours 10 tablet 1    semaglutide-weight management (WEGOVY) 0.5 MG/0.5ML Subcutaneous Solution Auto-injector 0.5 mg.      Phentermine HCl 15 MG Oral Cap   (Patient not taking: Reported on 12/26/2024)      ondansetron 4 MG Oral Tablet Dispersible Take 1 tablet (4 mg total) by mouth every 8 (eight) hours as needed for Nausea. 20 tablet 0    FLUTICASONE PROPIONATE 50 MCG/ACT Nasal Suspension SPRAY 2 SPRAYS INTO EACH NOSTRIL EVERY DAY 48 mL 1    Riboflavin 400 MG Oral Tab Take by mouth.      Coenzyme Q10 (CO Q 10 OR) Take 300 mg by mouth.      Rimegepant Sulfate (NURTEC) 75 MG Oral Tablet Dispersible Take 1 tablet by mouth daily as needed. 4 tablet 0    cyclobenzaprine 10 MG Oral Tab Take 1 tablet (10 mg total) by mouth 3 (three) times daily as needed for Muscle spasms. 30 tablet 3    diazepam (VALIUM) 10 MG Oral Tab Take 1 tablet (10 mg total) by mouth every 12 (twelve) hours as needed (back spasms). 30 tablet 1    ergocalciferol 1.25 MG (44387 UT) Oral Cap Take 1 capsule (50,000 Units total) by mouth every 14 (fourteen) days. 6 capsule 3    Hyoscyamine Sulfate (LEVSIN) 0.125 MG Oral Tab Take 1 tablet (125 mcg total) by mouth every 6 (six) hours as needed for Cramping. 60 tablet 3    Aspirin 81 MG Oral Cap Take 81 mg by mouth daily.      FLUoxetine HCl 40 MG Oral Cap Take 40 mg by mouth once daily.  1    Meclizine HCl 25 MG Oral Tab Take 1 tablet (25 mg total) by mouth 3 (three) times daily as needed. 20 tablet 2    fexofenadine 180 MG Oral Tab Take 180 mg by mouth daily.      Pseudoephedrine HCl (SUDAFED OR) Take by mouth. 2 tabs bid prn      MULTI-VITAMIN OR TABS 1 TABLET DAILY         ALLERGIES:   Allergies[1]    REVIEW OF SYSTEMS:   Patient-reported ROS  Constitutional  Sleep Disturbance: admits  Chills: denies  Fever: denies  Weight Gain: denies  Weight Loss: denies   Cardiovascular  Chest Pain: denies  Irregular Heartbeat: denies   Respiratory  Painful Breathing: denies  Wheezing: denies   Gastrointestinal  Bowel Incontinence: denies  Heartburn: denies  Abdominal Pain: denies  Blood in Stool : denies  Rectal Pain: denies   Hematology  Easy Bruising: denies  Easy  Bleeding: denies   Genitourinary  Difficulty Urinating: admits  Bladder Incontinence: denies  Pelvic Pain: denies  Painful Urination: denies   Musculoskeletal  Joint Stiffness: admits  Painful Joints: admits  Tailbone Pain: denies  Swollen Joints: denies   Peripheral Vascular  Swelling of Legs/Feet: denies  Cold Extremities: admits   Skin  Open Sores: denies  Nodules or Lumps: denies  Rash: denies   Neurological  Loss of Strength Since last Visit: admits  Tingling/Numbness: admits  Balance: admits   Psychiatric  Anxiety: denies  Depressed Mood: denies             PHYSICAL EXAM:   Ht 69\"   Wt 145 lb (65.8 kg)   BMI 21.41 kg/m²     Body mass index is 21.41 kg/m².      General: No immediate distress  Head: Normocephalic/ Atraumatic  Extremities: No lower extremity edema bilaterally. Peripheral pulses intact.   Spine: Limited and painful lumbar flexion  Hips: full and painfree ROM   Neuro:   Cognition: alert & oriented x 3, attentive, able to follow 2 step commands, comprehention intact, spontaneous speech intact  Strength: Lower extremities have 5/5 strength  Sensation: Normal lower extremities  Reflexes: Normal lower extremities  SLR: Positive straight leg raise on the left    Data    Radiology Imaging:  I personally reviewed a lumbar MRI from December 2024 showing mild facet arthropathy, disc degeneration at L4-5 and L5-S1.  At L4-5 there is a moderately large left paracentral disc herniation into the lateral recess.  There is also a left L5-S1 foraminal narrowing on a degenerative basis.      ASSESSMENT AND PLAN:  1. Lumbar radiculopathy  She has L5 radiculopathy due to a disc herniation.  I recommend a lumbar transforaminal epidural injection.  Continue physical therapy as tolerated particularly extension exercises.  Gave her some Mobic.  Return 2 weeks postinjection for follow-up.  - Lumbar Transforaminal Epidural Injection (TFESI); Future        The patient was in agreement with the assessment and plan.  All  questions were answered.        Anshul Henderson MD  Physical Medicine and Rehabilitation/Sports Medicine  [facility]         [1]   Allergies  Allergen Reactions    Sulfa Antibiotics

## 2024-12-20 NOTE — TELEPHONE ENCOUNTER
Initiated authorization for Left L5 TFESI under fluoroscopic guidance. CPT/HCPCS 05301 dx:M54.16 to be done at [facility] with [facility] portal    Status: Approved - Prior Authorization/Notification is not required for the requested service(s).  Decision ID #: [id number]  Valid: 12/20/24-3/20/25    Authorization is not required based on medical necessity, however, is not a guarantee of payment and may be subject to review once claim is submitted-Covered Benefit.

## 2024-12-23 NOTE — TELEPHONE ENCOUNTER
Patient has been scheduled for Left L5 transforaminal epidural steroid injection on 12/26/25 at the [facility] with Dr. Henderson.   Anesthesia type:  Local  Please note: The [facility] will call the business day prior to discuss the exact time/arrival and additional instructions for your appointment.  Patient was advised that if he/she does receive the covid vaccine it needs to be at least 2 weeks before or after the injection.  Medications and allergies reviewed.  Educated to hold NSAIDS (Aleve, Ibuprofen, Motrin, Advil) and anti-inflammatories (Meloxicam, Naproxen, Diclofenac, Celebrex) and for cervical injections must hold Multi-Vitamins, Vitamin E, Fish Oil/Omega-3.  Patient informed of [facility]'s  policy:  The patient will require transportation arrangements to and from the procedure, with the  present on site for the entire visit.  Without a , the appointment is subject to cancellation.    [facility] is located in the [facility] 1st floor [address], [place] [address].   may park in the yellow/purple parking lot.  Patient verbalized understanding and agrees with plan.  Scheduled in Epic: Yes  Scheduled in Surgical Case: Yes  Follow up appointment made: NOV: Visit date not found

## 2024-12-26 PROBLEM — M54.16 LUMBAR RADICULOPATHY: Status: ACTIVE | Noted: 2024-12-26

## 2025-01-03 ENCOUNTER — PATIENT MESSAGE (OUTPATIENT)
Dept: PHYSICAL MEDICINE AND REHAB | Facility: CLINIC | Age: 50
End: 2025-01-03

## 2025-01-03 DIAGNOSIS — M54.16 LUMBAR RADICULOPATHY: Primary | ICD-10-CM

## 2025-01-07 RX ORDER — TRAMADOL HYDROCHLORIDE 50 MG/1
50 TABLET ORAL EVERY 6 HOURS PRN
Qty: 60 TABLET | Refills: 0 | Status: SHIPPED | OUTPATIENT
Start: 2025-01-07

## 2025-01-07 RX ORDER — PREDNISONE 10 MG/1
TABLET ORAL
Qty: 28 TABLET | Refills: 0 | Status: SHIPPED | OUTPATIENT
Start: 2025-01-07

## 2025-01-07 NOTE — TELEPHONE ENCOUNTER
Please let the patient know that I am recommending a prednisone taper and I added tramadol.  Hopefully that will calm things down.  Otherwise I recommend a repeat epidural injection via a different route (L5-S1 interlaminar).  Another option would be to see one of the neurosurgeons for a consult as she does have a large disc herniation.  I pended the medication orders below, please let me know whether she prefers to go the repeat epidural route first versus the surgical route, thanks

## 2025-01-08 ENCOUNTER — TELEPHONE (OUTPATIENT)
Dept: PHYSICAL MEDICINE AND REHAB | Facility: CLINIC | Age: 50
End: 2025-01-08

## 2025-01-10 ENCOUNTER — TELEPHONE (OUTPATIENT)
Dept: PHYSICAL MEDICINE AND REHAB | Facility: CLINIC | Age: 50
End: 2025-01-10

## 2025-01-10 DIAGNOSIS — M47.816 LUMBAR SPONDYLOSIS: Primary | ICD-10-CM

## 2025-01-10 NOTE — TELEPHONE ENCOUNTER
Initiated authorization for L5-S1 interlaminar epidural injection. CPT/HCPCS 96832 dx:M54.16 to be done at [facility] with [facility] portal    Status: Approved - Prior Authorization/Notification is not required  Decision ID #: [id number]  Valid: 10/10/25-4/10/25    Authorization is not required based on medical necessity, however, is not a guarantee of payment and may be subject to review once claim is submitted-Covered Benefit.

## 2025-01-13 ENCOUNTER — TELEPHONE (OUTPATIENT)
Dept: SURGERY | Facility: CLINIC | Age: 50
End: 2025-01-13

## 2025-01-13 ENCOUNTER — OFFICE VISIT (OUTPATIENT)
Dept: SURGERY | Facility: CLINIC | Age: 50
End: 2025-01-13
Payer: COMMERCIAL

## 2025-01-13 VITALS
BODY MASS INDEX: 21.48 KG/M2 | HEIGHT: 69 IN | HEART RATE: 87 BPM | OXYGEN SATURATION: 98 % | DIASTOLIC BLOOD PRESSURE: 58 MMHG | SYSTOLIC BLOOD PRESSURE: 90 MMHG | WEIGHT: 145 LBS

## 2025-01-13 DIAGNOSIS — M54.16 LUMBAR RADICULOPATHY: Primary | ICD-10-CM

## 2025-01-13 DIAGNOSIS — M47.816 LUMBAR SPONDYLOSIS: ICD-10-CM

## 2025-01-13 DIAGNOSIS — M51.362 DEGENERATION OF INTERVERTEBRAL DISC OF LUMBAR REGION WITH DISCOGENIC BACK PAIN AND LOWER EXTREMITY PAIN: ICD-10-CM

## 2025-01-13 DIAGNOSIS — M48.061 STENOSIS OF LATERAL RECESS OF LUMBAR SPINE: ICD-10-CM

## 2025-01-13 PROCEDURE — 99205 OFFICE O/P NEW HI 60 MIN: CPT | Performed by: STUDENT IN AN ORGANIZED HEALTH CARE EDUCATION/TRAINING PROGRAM

## 2025-01-13 RX ORDER — TOBRAMYCIN 3 MG/ML
SOLUTION/ DROPS OPHTHALMIC
COMMUNITY
Start: 2023-10-23

## 2025-01-13 RX ORDER — SEMAGLUTIDE 2.4 MG/.75ML
INJECTION, SOLUTION SUBCUTANEOUS
COMMUNITY
Start: 2024-12-25

## 2025-01-13 RX ORDER — RIZATRIPTAN BENZOATE 10 MG/1
1 TABLET, ORALLY DISINTEGRATING ORAL
COMMUNITY

## 2025-01-13 RX ORDER — ESTRADIOL 4 UG/1
INSERT VAGINAL
COMMUNITY
Start: 2022-09-29

## 2025-01-13 RX ORDER — ESTRADIOL 2 MG/1
SYSTEM VAGINAL
COMMUNITY

## 2025-01-13 RX ORDER — METHYLPREDNISOLONE 4 MG/1
1 TABLET ORAL AS DIRECTED
COMMUNITY
Start: 2024-11-21

## 2025-01-13 RX ORDER — GALCANEZUMAB 120 MG/ML
INJECTION, SOLUTION SUBCUTANEOUS
COMMUNITY

## 2025-01-13 RX ORDER — ZAVEGEPANT 10 MG/.1ML
10 SPRAY NASAL
COMMUNITY
Start: 2025-01-07

## 2025-01-13 RX ORDER — ESTRADIOL 0.1 MG/G
0.5 CREAM VAGINAL
COMMUNITY
Start: 2024-10-03

## 2025-01-13 RX ORDER — VENLAFAXINE HYDROCHLORIDE 37.5 MG/1
CAPSULE, EXTENDED RELEASE ORAL
COMMUNITY

## 2025-01-13 NOTE — TELEPHONE ENCOUNTER
Patient has been scheduled for L5-S1 Interlaminar epidural steroid injection on 1/16/25 at the [facility] with Dr. Henderson.   Anesthesia type:  Local  Please note: The [facility] will call the business day prior to discuss the exact time/arrival and additional instructions for your appointment.  Patient was advised that if he/she does receive the covid vaccine it needs to be at least 2 weeks before or after the injection.  Medications and allergies reviewed.  Educated to hold NSAIDS (Aleve, Ibuprofen, Motrin, Advil) and anti-inflammatories (Meloxicam, Naproxen, Diclofenac, Celebrex) and for cervical injections must hold Multi-Vitamins, Vitamin E, Fish Oil/Omega-3.  Patient informed of [facility]'s  policy:  The patient will require transportation arrangements to and from the procedure, with the  present on site for the entire visit.  Without a , the appointment is subject to cancellation.    [facility] is located in the [facility] 1st [address] [address].   may park in the yellow/purple parking lot.  Patient verbalized understanding and agrees with plan.  Scheduled in Epic: Yes  Scheduled in Surgical Case: Yes  Follow up appointment made: NOV: 1/22/2025 Anshul Henderson MD

## 2025-01-13 NOTE — PROGRESS NOTES
The following individual(s) verbally consented to be recorded using ambient AI listening technology and understand that they can each withdraw their consent to this listening technology at any point by asking the clinician to turn off or pause the recording: Patient, Cristina Gonzalez, and , Fred Gonzalez, agreed to the usage of Abridge during the office visit.     New patient:  Reason for visit: New patient presents  left LOWER BACK INTO LEFT HIP TO GROIN INTO LEFT CALF  Dull pain and random shooting pain  Left leg weakness and numb    Dx: L4-5, L5 -S1   Referred: Edis Holder, MATILDE Saleh     Estimated time of onset:      Numeric Rating Scale:      Pain at Present:  7/10                                                                                                                       Distribution of Pain:    Left     Past Treatments for Current Pain Condition:    Surgery: No spinal surgery   Physical Therapy: Started PT in 11/14/2024.   Injections: 12/26/2024 and another   Dr. Henderson. In the past, had back injections with Dr. Almazan in 2008.   Medications: Tramadol and Gabapentin and prednisone     Prior diagnostic testing related to this condition:

## 2025-01-13 NOTE — H&P
[facility]  Neurological Surgery New Patient Clinic Note    Cristina Gonzalez  9/13/1975  [id number]  PCP: Faby Coombs MD  Referring Provider: MATILDE Hardinmhurst    REASON FOR VISIT:  Lumbar radiculopathy    HISTORY OF PRESENT ILLNESS:  Cristina Gonzalez is a(n) 49 year old new patient presenting with approximately two months of severe left-sided low back pain, radiating into the hip, groin, and occasionally into the calf. She traces the onset to November 2024, around the time she increased her exercise routine and took on more lifting responsibilities at home. Despite physical therapy, NSAIDs, muscle relaxants, and one lumbar epidural steroid injection on December 26, 2024, she reports only minimal relief. She denies new bowel or bladder dysfunction but describes difficulty sitting for extended periods and occasional numbness in the left leg after prolonged sitting. Her prior history includes episodic back pain dating to the early 2000s, managed conservatively until this more acute exacerbation. She has not had spinal surgery. Another epidural injection is planned this week, but she is considering surgical evaluation if conservative measures fail.    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  Repeat 2029.  Needs MAC    Special service or report       benign tumor of uterus - fibrothecoma     Special service or report       B. knee arthroscopy    Upper gi endoscopy - referral  3/2013    mild esophagitis.  SB Bx negative    Upper gi endoscopy performed  08/23/19:  Hiatal hernia    Upper gi endoscopy,biopsy  3/7/2013    Procedure: ESOPHAGOGASTRODUODENOSCOPY, POSSIBLE BIOPSY, POSSIBLE POLYPECTOMY 40983;  Surgeon: Jt Romero MD;  Location: [facility]     FAMILY HISTORY:  family history includes Anxiety in her mother; Cancer in her maternal grandfather; Dementia in her father and paternal grandmother; Diabetes in her paternal grandmother; Heart Attack in her paternal grandfather; Heart Disorder in her paternal grandfather; Other in her daughter, father, maternal grandmother, and sister; Psychiatric in her mother and sister; Stroke in her maternal grandmother; Tremor in her father.    SOCIAL HISTORY:   reports that she quit smoking about 29 years ago. Her smoking use included cigarettes. She started smoking about 31 years ago. She has a 0.4 pack-year smoking history. She has never used smokeless tobacco. She reports current alcohol use. She reports that she does not use drugs.    ALLERGIES:  Allergies[1]    MEDICATIONS:  Medications Ordered Prior to Encounter[2]    REVIEW OF SYSTEMS:  All other systems were reviewed and were negative except for those previously mentioned in the HPI    PHYSICAL EXAMINATION:  General: No acute distress.  Respiratory: Non-labored respirations bilaterally. No audible wheezing  Cardiovascular: Extremities warm and well-perfused.  Abdomen: Soft, nontender, nondistended.   Musculoskeletal: Moves all extremities well, symmetrically.  Extremities: No edema.    NEUROLOGIC EXAMINATION:  Mental status: Alert and oriented x 3  Speech: Clear, fluent  Cranial nerves: PERRLA, EOMI, face symmetric, with normal strength and sensation, tongue and palate midline, SCM 5/5  bilaterally  Motor:     RIGHT  Delt 5/5   Bic 5/5  Tri 5/5   HI 5/5    5/5  IP 5/5   Quad 5/5   Ham 5/5   AT 5/5   EHL 5/5 Brendan 5/5     LEFT    Delt 5/5   Bic 5/5  Tri 5/5   HI 5/5    5/5  IP 5/5   Quad 5/5   Ham 5/5   AT 5/5   EHL 5/5 Brendan 5/5   No pronator drift  Tone: Normal  Atrophy/Fasciculations: None  Sensation: Normal to light touch, symmetric, no neglect  Cerebellar: Normal finger nose finger  Gait: Normal, nondistressed heel toe tandem gait      Reflexes: 2+ throughout, symmetric, no Lui's    IMAGING:  MR Lumbar 12/13/2024: Demonstrates sacralization at L5 with notable degenerative changes at L4-5 and L5-S1. There is a sizable left paracentral disc herniation at L4-5 causing marked compression of the traversing nerve roots in the lateral recess. At L5-S1, a lesser degree of bulging and foraminal narrowing is noted. These findings correlate with her left-sided radicular symptoms. Of note, the left S1 nerve root appears larger than expected. This could be a result of prior injury (neuroma), ongoing swelling and inflammation, or possibly a benign nerve root mass (schwannoma).           ASSESSMENT:  Ms. Gonzalez presents with a significant left L4-5 disc herniation and associated radiculopathy, refractory to one transforaminal epidural injection and standard conservative management (physical therapy, oral medications). No emergent red flags (e.g., cauda equina) are present, but she remains severely symptomatic with persistent leg pain and functional limitation. Given that she has not yet completed a second planned injection and remains within the early three-month window of conservative care, nonoperative management (including injections and physical therapy) remains appropriate. Should her symptoms fail to improve or worsen, a minimally invasive surgical decompression (e.g., microdiscectomy) at L4-5 may be indicated to relieve nerve compression and restore function. Of note, some of her symptoms  may also be referable to left L5-S1 lateral recess stenosis. We will further investigate the findings described above with an MRI w and w/o contrast.     PLAN:  - Recommend proceeding with the scheduled second epidural injection, possibly targeting the L5-S1 region as indicated by her exam and imaging. This may help isolate the main pain generator if L4-5 vs. L5-S1 pathology is contributing.  - Order a contrast-enhanced lumbar MRI to further evaluate the enlarged nerve root and exclude an atypical lesion or conjoined nerve root.  - Continue PT with a focus on gentle stabilization, neutral spine exercises, and posture training to minimize disc loading. Emphasize proper techniques for daily tasks and lifting.  - Encourage light walking or gentle exercise as tolerated. Avoid prolonged or slouched sitting, heavy lifting, or high-impact moves that flex/rotate the lumbar spine.  - If the second injection and conservative measures fail to provide meaningful relief, we will discuss minimally invasive decompression at L4-5 and possibly L5-S1 if imaging and symptoms warrant. Evidence from the Spine Patient Outcomes Research Trial (SPORT) supports surgical management for large herniations causing severe sciatica when nonoperative care is unsuccessful.  - Return for re-evaluation in approximately 4-6 weeks unless earlier intervention is needed.  - Instructed her to notify me immediately if she develops any worsening neurological deficits (foot drop, significant weakness) or red flag symptoms (bowel/bladder changes).    Gregorio Martin MD  Neurological Surgery    [facility]  [address] [place] [address], [address]  [place] [address]  [phone]  Pager [phone]  1/13/2025 3:13 PM      This note was created using a voice-recognition transcribing system. Incorrect words or phrases may have been missed during proofreading. Please interpret accordingly.    Total Time    New Patient Total Time        60  minutes.      Activities       Preparing to see the patient (chart/tests/imaging review).       Obtaining and/or reviewing separately obtained history.       Performing a medically appropriate examination and/or evaluation.       Counseling and educating the patient/family/caregiver.       Ordering medications, tests, or procedures.       Referring and communicating with other health care professionals (when not separately reported).       Documenting clincal information in the electronic or other health record.       Independently interpreting results (not separately reported).    Communicating results to the patient/family/caregiver.    Care coordination (not separately reported).       [1]   Allergies  Allergen Reactions    Sulfa Antibiotics    [2]   Current Outpatient Medications on File Prior to Visit   Medication Sig Dispense Refill    predniSONE 10 MG Oral Tab Take 7 tablets today,take 6 tablets tomorrow, then decrease number of tablets by one tablet each of the remaining days 28 tablet 0    traMADol 50 MG Oral Tab Take 1 tablet (50 mg total) by mouth every 6 (six) hours as needed for Pain. 60 tablet 0    nitrofurantoin monohydrate macro 100 MG Oral Cap Take 100 mg by mouth 2 (two) times daily.      Eletriptan Hydrobromide 40 MG Oral Tab 40 mg.      AJOVY 225 MG/1.5ML Subcutaneous Solution Auto-injector Inject 225 mg into the skin every 30 (thirty) days.      Meloxicam 15 MG Oral Tab Take 1 tablet (15 mg total) by mouth daily. 30 tablet 0    TOPIRAMATE 25 MG Oral Tab TAKE 1 TABLET (25 MG TOTAL) BY MOUTH EVERY MORNING AND 3 TABLETS (75 MG TOTAL) NIGHTLY. 360 tablet 3    venlafaxine 150 MG Oral Capsule SR 24 Hr       PANTOPRAZOLE 40 MG Oral Tab EC TAKE 1 TABLET (40 MG TOTAL) BY MOUTH 2 (TWO) TIMES DAILY BEFORE MEALS. 180 tablet 3    ubrogepant 100 MG Oral Tab Take 1 tablet PO at the onset of migraine.  May repeat 1 dose in 2 hours as needed.  Max 2 doses in 24 hours 10 tablet 1    semaglutide-weight  management (WEGOVY) 0.5 MG/0.5ML Subcutaneous Solution Auto-injector 0.5 mg.      Phentermine HCl 15 MG Oral Cap  (Patient not taking: Reported on 12/26/2024)      ondansetron 4 MG Oral Tablet Dispersible Take 1 tablet (4 mg total) by mouth every 8 (eight) hours as needed for Nausea. 20 tablet 0    FLUTICASONE PROPIONATE 50 MCG/ACT Nasal Suspension SPRAY 2 SPRAYS INTO EACH NOSTRIL EVERY DAY 48 mL 1    Riboflavin 400 MG Oral Tab Take by mouth.      Coenzyme Q10 (CO Q 10 OR) Take 300 mg by mouth.      Rimegepant Sulfate (NURTEC) 75 MG Oral Tablet Dispersible Take 1 tablet by mouth daily as needed. 4 tablet 0    cyclobenzaprine 10 MG Oral Tab Take 1 tablet (10 mg total) by mouth 3 (three) times daily as needed for Muscle spasms. 30 tablet 3    diazepam (VALIUM) 10 MG Oral Tab Take 1 tablet (10 mg total) by mouth every 12 (twelve) hours as needed (back spasms). 30 tablet 1    ergocalciferol 1.25 MG (22566 UT) Oral Cap Take 1 capsule (50,000 Units total) by mouth every 14 (fourteen) days. 6 capsule 3    Hyoscyamine Sulfate (LEVSIN) 0.125 MG Oral Tab Take 1 tablet (125 mcg total) by mouth every 6 (six) hours as needed for Cramping. 60 tablet 3    Aspirin 81 MG Oral Cap Take 81 mg by mouth daily.      FLUoxetine HCl 40 MG Oral Cap Take 40 mg by mouth once daily.  1    Meclizine HCl 25 MG Oral Tab Take 1 tablet (25 mg total) by mouth 3 (three) times daily as needed. 20 tablet 2    fexofenadine 180 MG Oral Tab Take 180 mg by mouth daily.      Pseudoephedrine HCl (SUDAFED OR) Take by mouth. 2 tabs bid prn      MULTI-VITAMIN OR TABS 1 TABLET DAILY       No current facility-administered medications on file prior to visit.

## 2025-01-13 NOTE — TELEPHONE ENCOUNTER
Patient provided [facility] staff with imaging disc from 1.5 T [facility] for Dr. Martin to review.     12/13/2024 - MRI Spine Lumbar    Films uploaded to PACS.  Disc returned to the patient.

## 2025-01-14 ENCOUNTER — TELEPHONE (OUTPATIENT)
Dept: PHYSICAL MEDICINE AND REHAB | Facility: CLINIC | Age: 50
End: 2025-01-14

## 2025-01-14 ENCOUNTER — MED REC SCAN ONLY (OUTPATIENT)
Dept: PHYSICAL MEDICINE AND REHAB | Facility: CLINIC | Age: 50
End: 2025-01-14

## 2025-01-20 ENCOUNTER — PATIENT MESSAGE (OUTPATIENT)
Dept: SURGERY | Facility: CLINIC | Age: 50
End: 2025-01-20

## 2025-01-20 ENCOUNTER — HOSPITAL ENCOUNTER (OUTPATIENT)
Dept: MRI IMAGING | Facility: HOSPITAL | Age: 50
Discharge: HOME | End: 2025-01-20
Attending: STUDENT IN AN ORGANIZED HEALTH CARE EDUCATION/TRAINING PROGRAM
Payer: COMMERCIAL

## 2025-01-20 DIAGNOSIS — M47.816 LUMBAR SPONDYLOSIS: ICD-10-CM

## 2025-01-20 DIAGNOSIS — M51.362 DEGENERATION OF INTERVERTEBRAL DISC OF LUMBAR REGION WITH DISCOGENIC BACK PAIN AND LOWER EXTREMITY PAIN: ICD-10-CM

## 2025-01-20 DIAGNOSIS — M54.16 LUMBAR RADICULOPATHY: ICD-10-CM

## 2025-01-20 DIAGNOSIS — M48.061 STENOSIS OF LATERAL RECESS OF LUMBAR SPINE: ICD-10-CM

## 2025-01-20 PROCEDURE — A9575 INJ GADOTERATE MEGLUMI 0.1ML: HCPCS

## 2025-01-20 PROCEDURE — 72158 MRI LUMBAR SPINE W/O & W/DYE: CPT | Performed by: STUDENT IN AN ORGANIZED HEALTH CARE EDUCATION/TRAINING PROGRAM

## 2025-01-20 RX ORDER — GADOTERATE MEGLUMINE 376.9 MG/ML
15 INJECTION INTRAVENOUS
Status: COMPLETED | OUTPATIENT
Start: 2025-01-20 | End: 2025-01-20

## 2025-01-20 RX ADMIN — GADOTERATE MEGLUMINE 14 ML: 376.9 INJECTION INTRAVENOUS at 06:50:00

## 2025-01-21 NOTE — TELEPHONE ENCOUNTER
Per MRI lumbar results on 1-20-25:  CONCLUSION:    1. Transitional vertebrae with sacralization of the L5 vertebral body.      2. L3-4 left posterior disc herniation with significant mass effect on the L4 nerve root.      3. L4-5 mild to moderate degenerative disc bulge/osteophyte complex causing mild to moderate left greater than right subarticular zone and neural foraminal narrowing.      4. No significant abnormal enhancement of the nerve roots to suggest a neuroma.           Pt is requesting MRI review.   msg stating pt needs apt for MRI review.

## 2025-01-22 ENCOUNTER — TELEPHONE (OUTPATIENT)
Dept: SURGERY | Facility: CLINIC | Age: 50
End: 2025-01-22

## 2025-01-22 ENCOUNTER — OFFICE VISIT (OUTPATIENT)
Dept: SURGERY | Facility: CLINIC | Age: 50
End: 2025-01-22
Payer: COMMERCIAL

## 2025-01-22 VITALS
DIASTOLIC BLOOD PRESSURE: 73 MMHG | SYSTOLIC BLOOD PRESSURE: 110 MMHG | OXYGEN SATURATION: 97 % | WEIGHT: 150 LBS | HEIGHT: 69 IN | BODY MASS INDEX: 22.22 KG/M2 | HEART RATE: 96 BPM

## 2025-01-22 DIAGNOSIS — M47.816 LUMBAR SPONDYLOSIS: ICD-10-CM

## 2025-01-22 DIAGNOSIS — M51.362 DEGENERATION OF INTERVERTEBRAL DISC OF LUMBAR REGION WITH DISCOGENIC BACK PAIN AND LOWER EXTREMITY PAIN: ICD-10-CM

## 2025-01-22 DIAGNOSIS — M54.16 LUMBAR RADICULOPATHY: ICD-10-CM

## 2025-01-22 DIAGNOSIS — M51.16 LUMBAR DISC HERNIATION WITH RADICULOPATHY: Primary | ICD-10-CM

## 2025-01-22 DIAGNOSIS — M48.061 STENOSIS OF LATERAL RECESS OF LUMBAR SPINE: ICD-10-CM

## 2025-01-22 DIAGNOSIS — M51.362 DEGENERATION OF INTERVERTEBRAL DISC OF LUMBAR REGION WITH DISCOGENIC BACK PAIN AND LOWER EXTREMITY PAIN: Primary | ICD-10-CM

## 2025-01-22 PROCEDURE — 99215 OFFICE O/P EST HI 40 MIN: CPT | Performed by: STUDENT IN AN ORGANIZED HEALTH CARE EDUCATION/TRAINING PROGRAM

## 2025-01-22 RX ORDER — GABAPENTIN 600 MG/1
300 TABLET ORAL 3 TIMES DAILY
Qty: 45 TABLET | Refills: 2 | Status: SHIPPED | OUTPATIENT
Start: 2025-01-22 | End: 2025-04-22

## 2025-01-22 RX ORDER — HYDROCODONE BITARTRATE AND ACETAMINOPHEN 5; 325 MG/1; MG/1
1 TABLET ORAL EVERY 8 HOURS PRN
Qty: 42 TABLET | Refills: 0 | Status: SHIPPED | OUTPATIENT
Start: 2025-01-22 | End: 2025-02-05

## 2025-01-22 NOTE — TELEPHONE ENCOUNTER
You are scheduled for Minimally Evasive Left L4-5. L5-S1 Laminectomies and L4-5 Discectomy on 02/19/2025 with Dr. Martin at [facility].     PCP clearance is needed within 30 days of surgery.  We have faxed a request for pre-op clearance to your primary care physician Dr. Coombs. Please contact their office for appointment.  Please schedule this appointment AT LEAST 1 WEEK PRIOR TO SURGERY DATE.  Your PCP may order additional testing or clearance from another specialist.   You will have an appointment with our RN Spine Navigator prior to surgery.  This is an educational telehealth/phone visit in which you will receive more information on the specifics of your surgery, instructions for before surgery, what to expect in the hospital and how to take care of yourself after surgery.  Your surgeon wants you to to participate in this visit so that you are as prepared for surgery as possible and have an opportunity to ask questions.  If possible, we would like your care partner to be present for the visit as well.      You will need to contact the Pre-admission department at [phone]. The Pre-Admission nurse will review your health history and give you information for day-of instructions. The nurse will also get you scheduled for all your pre-op testing required for your surgery.   You will need pre-operative labs which will include blood work and a MRSA/MSSA test (nasal swab). You will need for fast for the blood work. You may also need an EKG and/or chest x-ray depending on your current health status.    Do not take any blood thinning medications such as over the counter NSAIDS (advil, aleve, ibuprofen etc.), herbal supplements (garlic,tumeric etc.), vitamin E, fish oil or krill oil for at least 7 days prior to surgery.  If you have questions about your other medications, please call our office or send a [employer] message.   You should have nothing to eat or drink after 11:00pm the night prior to surgery except  for the following:  Do drink 12 ounces of regular Gatorade (NOT RED) 12 hours and 4 hours prior to your scheduled surgery time. Do not drink any other liquids (including water) before your surgery. Take 1000 mg of Tylenol (Acetaminophen) 4 hours before your scheduled surgery time, take this with your scheduled Gatorade.  In order to prevent infection, you will need to purchase Hibiclens soap and use it after your regular body soap for 5 days prior to your procedure.  The last shower should be the night before surgery.  This soap can be found at any pharmacy in the first aid section. See detailed instructions below.    Our office will get prior authorization for surgery through your insurance.   Surgery is usually scheduled as outpatient admission.  This is an estimate and varies from person to person.  Ultimately, the surgeon will determine when you are ready to be discharged.  The hospital will contact you 1-2 days before surgery with your arrival time.      If you require FMLA or disability paperwork for your recovery, please make sure to either drop it off or have it faxed to our office at [phone]. Be sure the form has your name and date of birth on it.  The form will be faxed to our Forms Department and they will complete it for you.  There is a 25$ fee for all forms that need to be filled out.  Please be aware there is a 10-14 day turnaround time.  You will need to sign a release of information (ELIZABETH) form if your paperwork does not come with one.  You may call the Forms Department with any questions at [phone].  Their fax number is [phone].      POST OP CARE--First Two Weeks  No bending at waist, twisting, pushing or pulling  No lifting more than 10 lb (a gallon of milk)  Wear cervical collar/lumbar brace, if prescribed, as instructed by surgeon  No overhead reaching  No driving until approved by your surgeon   Change positions frequently. No prolonged sitting or standing.  Increase walking as  tolerated to avoid blood clots  No NSAIDs until approved by surgeon (ibuprofen, aleve, advil, meloxicam, Celebrex, diclofenac etc).   Remove any bandage on post op day 3.  Leave incision open to air.  Glue will fall off on its own.  If you have staples or sutures that are not dissolvable, these will be removed at your 2 or 3 week post op visit.   Check your incision for signs of infection daily (redness, warmth, drainage, increased pain at incision site, fever)  Do not shower until post op day 3.  Do not allow water pressure to directly hit the incision.  Do not scrub the incision and avoid any lotion, creams or perfume near the incision site.  No swimming, hot tubs or baths until your incision is completely healed.  Take pain medication as prescribed, if needed.  Constipation is a common side effect of opioids.  If you experience constipation, drink plenty of water and take over-the-counter stool softeners daily.   Avoid nicotine and alcohol   When to call the office:  Increased or change in location of pain  Increased weakness in arms or legs  Incision drainage, redness or warmth  Fever  Bowel or bladder changes   Choking on food or liquids (cervical)  Pain, redness, swelling, color changes or warmth in lower leg     Hibiclens Bathing  Hibiclens is a body soap that is used before surgery to protect you from getting an infection post-operatively  Hibiclens comes in a large blue bottle and can be found in most pharmacies in the First Aid supplies  Shower with this daily for FIVE consecutive days before surgery, using the entire bottle over the five days.  The last shower should be the night before surgery.   Steps to bathing with Hibiclens  Do not use Hibiclens on your hair, face or private areas  Wash your hair and body as normal with your usual cleansers  Rinse well  Using a clean wet washcloth apply enough Hibiclens to cover your body. Wash from the neck down avoiding the genital areas and concentrating on the  surgical area  Rinse well  Dry yourself with a clean, dry towel  Do not use any powders, creams, lotions or sprays on your body as these attract bacteria  Deodorant, hand lotion and facial creams are acceptable.  For Office Use Only:  Medical Clearance Requested: PCP  PA: TED  CPT Codes: NEED

## 2025-01-22 NOTE — PATIENT INSTRUCTIONS
You are scheduled for Minimum Evasive Left L4-5. L5-S1 Laminectomies and L4-5 Discectomy on 02/19/2025 with Dr. Martin at [facility].    PCP clearance is needed within 30 days of surgery.  We have faxed a request for pre-op clearance to your primary care physician Dr. Coombs. Please contact their office for appointment.  Please schedule this appointment AT LEAST 1 WEEK PRIOR TO SURGERY DATE.  Your PCP may order additional testing or clearance from another specialist.   You will have an appointment with our RN Spine Navigator prior to surgery.  This is an educational telehealth/phone visit in which you will receive more information on the specifics of your surgery, instructions for before surgery, what to expect in the hospital and how to take care of yourself after surgery.  Your surgeon wants you to to participate in this visit so that you are as prepared for surgery as possible and have an opportunity to ask questions.  If possible, we would like your care partner to be present for the visit as well.      You will need to contact the Pre-admission department at [phone]. The Pre-Admission nurse will review your health history and give you information for day-of instructions. The nurse will also get you scheduled for all your pre-op testing required for your surgery.   You will need pre-operative labs which will include blood work and a MRSA/MSSA test (nasal swab). You will need for fast for the blood work. You may also need an EKG and/or chest x-ray depending on your current health status.    Do not take any blood thinning medications such as over the counter NSAIDS (advil, aleve, ibuprofen etc.), herbal supplements (garlic,tumeric etc.), vitamin E, fish oil or krill oil for at least 7 days prior to surgery.  If you have questions about your other medications, please call our office or send a inSelly message.   You should have nothing to eat or drink after 11:00pm the night prior to surgery except for  the following:  Do drink 12 ounces of regular Gatorade (NOT RED) 12 hours and 4 hours prior to your scheduled surgery time. Do not drink any other liquids (including water) before your surgery. Take 1000 mg of Tylenol (Acetaminophen) 4 hours before your scheduled surgery time, take this with your scheduled Gatorade.  In order to prevent infection, you will need to purchase Hibiclens soap and use it after your regular body soap for 5 days prior to your procedure.  The last shower should be the night before surgery.  This soap can be found at any pharmacy in the first aid section. See detailed instructions below.    Our office will get prior authorization for surgery through your insurance.   Surgery is usually scheduled as outpatient admission.  This is an estimate and varies from person to person.  Ultimately, the surgeon will determine when you are ready to be discharged.  The hospital will contact you 1-2 days before surgery with your arrival time.     If you require FMLA or disability paperwork for your recovery, please make sure to either drop it off or have it faxed to our office at [phone]. Be sure the form has your name and date of birth on it.  The form will be faxed to our Forms Department and they will complete it for you.  There is a 25$ fee for all forms that need to be filled out.  Please be aware there is a 10-14 day turnaround time.  You will need to sign a release of information (ELIZABETH) form if your paperwork does not come with one.  You may call the Forms Department with any questions at [phone].  Their fax number is [phone].     POST OP CARE--First Two Weeks  No bending at waist, twisting, pushing or pulling  No lifting more than 10 lb (a gallon of milk)  Wear cervical collar/lumbar brace, if prescribed, as instructed by surgeon  No overhead reaching  No driving until approved by your surgeon   Change positions frequently. No prolonged sitting or standing.  Increase walking as  tolerated to avoid blood clots  No NSAIDs until approved by surgeon (ibuprofen, aleve, advil, meloxicam, Celebrex, diclofenac etc).   Remove any bandage on post op day 3.  Leave incision open to air.  Glue will fall off on its own.  If you have staples or sutures that are not dissolvable, these will be removed at your 2 or 3 week post op visit.   Check your incision for signs of infection daily (redness, warmth, drainage, increased pain at incision site, fever)  Do not shower until post op day 3.  Do not allow water pressure to directly hit the incision.  Do not scrub the incision and avoid any lotion, creams or perfume near the incision site.  No swimming, hot tubs or baths until your incision is completely healed.  Take pain medication as prescribed, if needed.  Constipation is a common side effect of opioids.  If you experience constipation, drink plenty of water and take over-the-counter stool softeners daily.   Avoid nicotine and alcohol   When to call the office:  Increased or change in location of pain  Increased weakness in arms or legs  Incision drainage, redness or warmth  Fever  Bowel or bladder changes   Choking on food or liquids (cervical)  Pain, redness, swelling, color changes or warmth in lower leg    Hibiclens Bathing  Hibiclens is a body soap that is used before surgery to protect you from getting an infection post-operatively  Hibiclens comes in a large blue bottle and can be found in most pharmacies in the First Aid supplies  Shower with this daily for FIVE consecutive days before surgery, using the entire bottle over the five days.  The last shower should be the night before surgery.   Steps to bathing with Hibiclens  Do not use Hibiclens on your hair, face or private areas  Wash your hair and body as normal with your usual cleansers  Rinse well  Using a clean wet washcloth apply enough Hibiclens to cover your body. Wash from the neck down avoiding the genital areas and concentrating on the  surgical area  Rinse well  Dry yourself with a clean, dry towel  Do not use any powders, creams, lotions or sprays on your body as these attract bacteria  Deodorant, hand lotion and facial creams are acceptable.   Refill policies:     Contact your pharmacy at least 5 days prior to running out of medication and have them send an electronic request or submit request through the “request refill” option in your Wik[employer] account.  Allow 3-5 business days for refills; controlled substances may take longer.  If your prescription is due for a refill, please make sure you have a follow up appointment scheduled with the appropriate prescribing physician.  To best provide you care, patients receiving routine medications need to be seen at least once a year.  Patients receiving narcotic/controlled substance medications need to be seen at least once every 3 months.  Patients receiving narcotic/controlled substance medications will be required to sign an Opioid Treatment Agreement/Contract.  Refills will not be refilled on weekends or holidays; on-call physicians will not refill routine medications.  No narcotics or controlled substances are refilled after noon on Fridays or by on-call physicians.  Federal Law states narcotics must be electronically prescribed.  A 30-day supply with no refills is the maximum allowed by law.  In the event that your preferred pharmacy does not have the requested medication in stock (e.g., Backordered), it is your responsibility to find another pharmacy that has the requested medication available.  We will gladly send a new prescription to that pharmacy at your request.

## 2025-01-22 NOTE — PROGRESS NOTES
The following individual(s) verbally consented to be recorded using ambient AI listening technology and understand that they can each withdraw their consent to this listening technology at any point by asking the clinician to turn off or pause the recording: The patient, Cristina Gonzalez, and her , Fred Gonzalez, agreed to the usage of AbrRachio during the office visit.     Established patient returns to clinic to review MRI Lumbar results. Patient states that the lumbar injection with Dr. Henderson did not help. She was told by PT to hold off until she sees the Neurosurgeon. She is currently taking Gabapentin and would like to discuss a possible refill and other medications for pain relief.

## 2025-01-22 NOTE — H&P (VIEW-ONLY)
[facility]  Neurological Surgery Established Clinic Note    Cristina Gonzalez  9/13/1975  [id number]  PCP: Faby Coombs MD  Referring Provider: MATILDE Hardin    REASON FOR VISIT:  Lumbar radiculopathy    HISTORY OF PRESENT ILLNESS 1/13/2025:  Cristina Gonzalez is a(n) 49 year old new patient presenting with approximately two months of severe left-sided low back pain, radiating into the hip, groin, and occasionally into the calf. She traces the onset to November 2024, around the time she increased her exercise routine and took on more lifting responsibilities at home. Despite physical therapy, NSAIDs, muscle relaxants, and one lumbar epidural steroid injection on December 26, 2024, she reports only minimal relief. She denies new bowel or bladder dysfunction but describes difficulty sitting for extended periods and occasional numbness in the left leg after prolonged sitting. Her prior history includes episodic back pain dating to the early 2000s, managed conservatively until this more acute exacerbation. She has not had spinal surgery. Another epidural injection is planned this week, but she is considering surgical evaluation if conservative measures fail.    INTERVAL HISTORY 1/22/2025:  Cristina Gonzalez returns following her recent lumbar transforaminal epidural injection, which afforded only transient, minimal relief of her persistent low back and left leg pain. She continues to experience marked discomfort unless she is lying completely flat, reporting a deep aching sensation radiating to her left hip and occasional numbness in her leg after sitting. She has been intermittently using gabapentin and has discontinued tramadol due to perceived lack of benefit. She is motivated to pursue surgical intervention given the unremitting nature of her symptoms and the failure of conservative measures, including physical therapy and epidural injections.    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 Repeat 2029.  Needs MAC    Special service or report       benign tumor of uterus - fibrothecoma     Special service or report       B. knee arthroscopy    Upper gi endoscopy - referral  3/2013    mild esophagitis.  SB Bx negative    Upper gi endoscopy performed  08/23/19:  Hiatal hernia    Upper gi endoscopy,biopsy  3/7/2013    Procedure: ESOPHAGOGASTRODUODENOSCOPY, POSSIBLE BIOPSY, POSSIBLE POLYPECTOMY 99059;  Surgeon: Jt Romero MD;  Location: [facility]     FAMILY HISTORY:  family history includes Anxiety in her mother; Cancer in her maternal grandfather; Dementia in her father and paternal grandmother; Diabetes in her paternal grandmother; Heart Attack in her paternal grandfather; Heart Disorder in her paternal grandfather; Other in her daughter, father, maternal grandmother, and sister; Psychiatric in her mother and sister; Stroke in her maternal grandmother; Tremor in her father.    SOCIAL HISTORY:   reports that she quit smoking about 29 years ago. Her smoking use included cigarettes. She started smoking about 31 years ago. She has a 0.4 pack-year smoking history. She has never used smokeless tobacco. She reports current alcohol use. She reports that she does not use  drugs.    ALLERGIES:  Allergies[1]    MEDICATIONS:  Medications Ordered Prior to Encounter[2]    REVIEW OF SYSTEMS:  All other systems were reviewed and were negative except for those previously mentioned in the HPI    PHYSICAL EXAMINATION:  General: No acute distress.  Respiratory: Non-labored respirations bilaterally. No audible wheezing  Cardiovascular: Extremities warm and well-perfused.  Abdomen: Soft, nontender, nondistended.   Musculoskeletal: Moves all extremities well, symmetrically.  Extremities: No edema.    NEUROLOGIC EXAMINATION:  Mental status: Alert and oriented x 3  Speech: Clear, fluent  Cranial nerves: PERRLA, EOMI, face symmetric, with normal strength and sensation, tongue and palate midline, SCM 5/5 bilaterally  Motor:     RIGHT  Delt 5/5   Bic 5/5  Tri 5/5   HI 5/5    5/5  IP 5/5   Quad 5/5   Ham 5/5   AT 5/5   EHL 5/5 Brendan 5/5     LEFT    Delt 5/5   Bic 5/5  Tri 5/5   HI 5/5    5/5  IP 5/5   Quad 5/5   Ham 5/5   AT 5/5   EHL 5/5 Brendan 5/5   No pronator drift  Tone: Normal  Atrophy/Fasciculations: None  Sensation: Normal to light touch, symmetric, no neglect  Cerebellar: Normal finger nose finger  Gait: Normal, nondistressed heel toe tandem gait      Reflexes: 2+ throughout, symmetric, no Lui's    IMAGING:  MR Lumbar 12/13/2024: Demonstrates sacralization at L5 with notable degenerative changes at L4-5 and L5-S1. There is a sizable left paracentral disc herniation at L4-5 causing marked compression of the traversing nerve roots in the lateral recess. At L5-S1, a lesser degree of bulging and foraminal narrowing is noted. These findings correlate with her left-sided radicular symptoms. Of note, the left S1 nerve root appears larger than expected. This could be a result of prior injury (neuroma), ongoing swelling and inflammation, or possibly a benign nerve root mass (schwannoma).           ASSESSMENT:  Ms. Gonzalez has severe left-sided radiculopathy resulting from a sizable disc  herniation at L4-5 and associated degenerative changes at L5-S1. Her pain remains refractory to conservative management, including therapy, NSAIDs, muscle relaxants, gabapentin, and epidural steroid injection. She reports significant functional limitation and wishes to pursue definitive intervention. Given the imaging and her persistent symptoms, I believe she is an appropriate candidate for minimally invasive left-sided L4-5 and L5-S1 decompression to remove the compressive pathology and provide symptomatic relief. Although every surgery carries risks--including dural tear, infection, residual pain, or instability--the likelihood of meaningful improvement for her radiculopathy is high once direct nerve compression is relieved.    PLAN:  Proceed with a minimally invasive L4-5 and L5-S1 surgical decompression.  Continue baby aspirin 81 mg daily, given her history of carotid stenting.  Provide gabapentin 300 mg (up to three times daily) to reduce nerve pain until surgery.  Prescribe a short course of Norco (hydrocodone-acetaminophen) for breakthrough pain, with caution regarding total daily acetaminophen intake.   on postoperative recovery expectations: no lifting >10 lbs for the first month, avoiding bending/twisting at the waist, and progressively increasing activity as tolerated.  Arrange surgical scheduling and ensure her preoperative clearance, if needed, is up to date.  Instruct to return immediately if she experiences severe new symptoms (e.g., acute weakness, bowel/bladder dysfunction) prior to the scheduled surgery.    Gregorio Martin MD  Neurological Surgery    [facility] [address], [address]  [place] [address]  [phone]  Pager [phone]  1/22/2025 3:31 PM      This note was created using a voice-recognition transcribing system. Incorrect words or phrases may have been missed during proofreading. Please interpret accordingly.    Total Time     Established Patient Total Time       30  minutes.      Activities       Preparing to see the patient (chart/tests/imaging review).       Obtaining and/or reviewing separately obtained history.       Performing a medically appropriate examination and/or evaluation.       Counseling and educating the patient/family/caregiver.       Ordering medications, tests, or procedures.       Referring and communicating with other health care professionals (when not separately reported).       Documenting clincal information in the electronic or other health record.       Independently interpreting results (not separately reported).    Communicating results to the patient/family/caregiver.    Care coordination (not separately reported).       [1]   Allergies  Allergen Reactions    Sulfa Antibiotics    [2]   Current Outpatient Medications on File Prior to Visit   Medication Sig Dispense Refill    ALPRAZolam 0.5 MG Oral Tab Take 0.5 mg by mouth 2 (two) times daily.      gabapentin 100 MG Oral Cap Take 100 mg by mouth 2 (two) times daily.      Galcanezumab-gnlm (EMGALITY) 120 MG/ML Subcutaneous Solution Auto-injector Emgality Pen 120 mg/mL subcutaneous pen injector   INJECT 240 MG INTO THE SKIN EVERY 28 DAYS LOADING DOSE      rizatriptan 10 MG Oral Tablet Dispersible Place 1 Ring vaginally every 28 days.      ZAVZPRET 10 MG/ACT Nasal Solution 10 mg by Nasal route.      WEGOVY 2.4 MG/0.75ML Subcutaneous Solution Auto-injector INJECT 2.4 MG SUBCUTANEOUSLY ONE TIME PER WEEK FOR 84 DAYS      venlafaxine ER 37.5 MG Oral Capsule SR 24 Hr venlafaxine ER 37.5 mg capsule,extended release 24 hr      methylPREDNISolone 4 MG Oral Tablet Therapy Pack Take 1 Bottle by mouth As Directed.      estradiol 0.1 MG/GM Vaginal Cream Place 0.5 g vaginally twice a week.      ESTRING 7.5 MCG/24HR Vaginal Ring PLACE 1 RING VAGINALLY 1 TIME FOR 1 DOSE. REMOVE AFTER 3 MONTHS      Estradiol Starter Pack (IMVEXXY STARTER PACK) 4 MCG Vaginal Insert PLACE 4 MCG  VAGINALLY DAILY.      onabotulinumtoxinA (BOTOX) 200 units Injection Recon Soln       tobramycin 0.3 % Ophthalmic Solution 1 drop to the affected eye(s) qid until eye(s) are clear.      predniSONE 10 MG Oral Tab Take 7 tablets today,take 6 tablets tomorrow, then decrease number of tablets by one tablet each of the remaining days 28 tablet 0    traMADol 50 MG Oral Tab Take 1 tablet (50 mg total) by mouth every 6 (six) hours as needed for Pain. 60 tablet 0    nitrofurantoin monohydrate macro 100 MG Oral Cap Take 100 mg by mouth 2 (two) times daily.      Eletriptan Hydrobromide 40 MG Oral Tab 40 mg.      AJOVY 225 MG/1.5ML Subcutaneous Solution Auto-injector Inject 225 mg into the skin every 30 (thirty) days.      Meloxicam 15 MG Oral Tab Take 1 tablet (15 mg total) by mouth daily. 30 tablet 0    TOPIRAMATE 25 MG Oral Tab TAKE 1 TABLET (25 MG TOTAL) BY MOUTH EVERY MORNING AND 3 TABLETS (75 MG TOTAL) NIGHTLY. 360 tablet 3    venlafaxine 150 MG Oral Capsule SR 24 Hr       PANTOPRAZOLE 40 MG Oral Tab EC TAKE 1 TABLET (40 MG TOTAL) BY MOUTH 2 (TWO) TIMES DAILY BEFORE MEALS. 180 tablet 3    ubrogepant 100 MG Oral Tab Take 1 tablet PO at the onset of migraine.  May repeat 1 dose in 2 hours as needed.  Max 2 doses in 24 hours 10 tablet 1    semaglutide-weight management (WEGOVY) 0.5 MG/0.5ML Subcutaneous Solution Auto-injector 0.5 mg.      Phentermine HCl 15 MG Oral Cap  (Patient not taking: Reported on 12/26/2024)      ondansetron 4 MG Oral Tablet Dispersible Take 1 tablet (4 mg total) by mouth every 8 (eight) hours as needed for Nausea. 20 tablet 0    FLUTICASONE PROPIONATE 50 MCG/ACT Nasal Suspension SPRAY 2 SPRAYS INTO EACH NOSTRIL EVERY DAY 48 mL 1    Riboflavin 400 MG Oral Tab Take by mouth.      Coenzyme Q10 (CO Q 10 OR) Take 300 mg by mouth.      Rimegepant Sulfate (NURTEC) 75 MG Oral Tablet Dispersible Take 1 tablet by mouth daily as needed. 4 tablet 0    cyclobenzaprine 10 MG Oral Tab Take 1 tablet (10 mg total) by  mouth 3 (three) times daily as needed for Muscle spasms. 30 tablet 3    diazepam (VALIUM) 10 MG Oral Tab Take 1 tablet (10 mg total) by mouth every 12 (twelve) hours as needed (back spasms). 30 tablet 1    ergocalciferol 1.25 MG (17654 UT) Oral Cap Take 1 capsule (50,000 Units total) by mouth every 14 (fourteen) days. 6 capsule 3    Hyoscyamine Sulfate (LEVSIN) 0.125 MG Oral Tab Take 1 tablet (125 mcg total) by mouth every 6 (six) hours as needed for Cramping. 60 tablet 3    Aspirin 81 MG Oral Cap Take 81 mg by mouth daily.      FLUoxetine HCl 40 MG Oral Cap Take 40 mg by mouth once daily.  1    Meclizine HCl 25 MG Oral Tab Take 1 tablet (25 mg total) by mouth 3 (three) times daily as needed. 20 tablet 2    fexofenadine 180 MG Oral Tab Take 180 mg by mouth daily.      Pseudoephedrine HCl (SUDAFED OR) Take by mouth. 2 tabs bid prn      MULTI-VITAMIN OR TABS 1 TABLET DAILY       No current facility-administered medications on file prior to visit.

## 2025-01-22 NOTE — PROGRESS NOTES
[facility]  Neurological Surgery Established Clinic Note    Cristina Gonzalez  9/13/1975  [id number]  PCP: Faby Coombs MD  Referring Provider: MATILDE Hardin    REASON FOR VISIT:  Lumbar radiculopathy    HISTORY OF PRESENT ILLNESS 1/13/2025:  Cristina Gonzalez is a(n) 49 year old new patient presenting with approximately two months of severe left-sided low back pain, radiating into the hip, groin, and occasionally into the calf. She traces the onset to November 2024, around the time she increased her exercise routine and took on more lifting responsibilities at home. Despite physical therapy, NSAIDs, muscle relaxants, and one lumbar epidural steroid injection on December 26, 2024, she reports only minimal relief. She denies new bowel or bladder dysfunction but describes difficulty sitting for extended periods and occasional numbness in the left leg after prolonged sitting. Her prior history includes episodic back pain dating to the early 2000s, managed conservatively until this more acute exacerbation. She has not had spinal surgery. Another epidural injection is planned this week, but she is considering surgical evaluation if conservative measures fail.    INTERVAL HISTORY 1/22/2025:  Cristina Gonzalez returns following her recent lumbar transforaminal epidural injection, which afforded only transient, minimal relief of her persistent low back and left leg pain. She continues to experience marked discomfort unless she is lying completely flat, reporting a deep aching sensation radiating to her left hip and occasional numbness in her leg after sitting. She has been intermittently using gabapentin and has discontinued tramadol due to perceived lack of benefit. She is motivated to pursue surgical intervention given the unremitting nature of her symptoms and the failure of conservative measures, including physical therapy and epidural injections.    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 Repeat 2029.  Needs MAC    Special service or report       benign tumor of uterus - fibrothecoma     Special service or report       B. knee arthroscopy    Upper gi endoscopy - referral  3/2013    mild esophagitis.  SB Bx negative    Upper gi endoscopy performed  08/23/19:  Hiatal hernia    Upper gi endoscopy,biopsy  3/7/2013    Procedure: ESOPHAGOGASTRODUODENOSCOPY, POSSIBLE BIOPSY, POSSIBLE POLYPECTOMY 18865;  Surgeon: Jt Romero MD;  Location: [facility]     FAMILY HISTORY:  family history includes Anxiety in her mother; Cancer in her maternal grandfather; Dementia in her father and paternal grandmother; Diabetes in her paternal grandmother; Heart Attack in her paternal grandfather; Heart Disorder in her paternal grandfather; Other in her daughter, father, maternal grandmother, and sister; Psychiatric in her mother and sister; Stroke in her maternal grandmother; Tremor in her father.    SOCIAL HISTORY:   reports that she quit smoking about 29 years ago. Her smoking use included cigarettes. She started smoking about 31 years ago. She has a 0.4 pack-year smoking history. She has never used smokeless tobacco. She reports current alcohol use. She reports that she does not use  drugs.    ALLERGIES:  Allergies[1]    MEDICATIONS:  Medications Ordered Prior to Encounter[2]    REVIEW OF SYSTEMS:  All other systems were reviewed and were negative except for those previously mentioned in the HPI    PHYSICAL EXAMINATION:  General: No acute distress.  Respiratory: Non-labored respirations bilaterally. No audible wheezing  Cardiovascular: Extremities warm and well-perfused.  Abdomen: Soft, nontender, nondistended.   Musculoskeletal: Moves all extremities well, symmetrically.  Extremities: No edema.    NEUROLOGIC EXAMINATION:  Mental status: Alert and oriented x 3  Speech: Clear, fluent  Cranial nerves: PERRLA, EOMI, face symmetric, with normal strength and sensation, tongue and palate midline, SCM 5/5 bilaterally  Motor:     RIGHT  Delt 5/5   Bic 5/5  Tri 5/5   HI 5/5    5/5  IP 5/5   Quad 5/5   Ham 5/5   AT 5/5   EHL 5/5 Brendan 5/5     LEFT    Delt 5/5   Bic 5/5  Tri 5/5   HI 5/5    5/5  IP 5/5   Quad 5/5   Ham 5/5   AT 5/5   EHL 5/5 Brendan 5/5   No pronator drift  Tone: Normal  Atrophy/Fasciculations: None  Sensation: Normal to light touch, symmetric, no neglect  Cerebellar: Normal finger nose finger  Gait: Normal, nondistressed heel toe tandem gait      Reflexes: 2+ throughout, symmetric, no Lui's    IMAGING:  MR Lumbar 12/13/2024: Demonstrates sacralization at L5 with notable degenerative changes at L4-5 and L5-S1. There is a sizable left paracentral disc herniation at L4-5 causing marked compression of the traversing nerve roots in the lateral recess. At L5-S1, a lesser degree of bulging and foraminal narrowing is noted. These findings correlate with her left-sided radicular symptoms. Of note, the left S1 nerve root appears larger than expected. This could be a result of prior injury (neuroma), ongoing swelling and inflammation, or possibly a benign nerve root mass (schwannoma).           ASSESSMENT:  Ms. Gonzalez has severe left-sided radiculopathy resulting from a sizable disc  herniation at L4-5 and associated degenerative changes at L5-S1. Her pain remains refractory to conservative management, including therapy, NSAIDs, muscle relaxants, gabapentin, and epidural steroid injection. She reports significant functional limitation and wishes to pursue definitive intervention. Given the imaging and her persistent symptoms, I believe she is an appropriate candidate for minimally invasive left-sided L4-5 and L5-S1 decompression to remove the compressive pathology and provide symptomatic relief. Although every surgery carries risks--including dural tear, infection, residual pain, or instability--the likelihood of meaningful improvement for her radiculopathy is high once direct nerve compression is relieved.    PLAN:  Proceed with a minimally invasive L4-5 and L5-S1 surgical decompression.  Continue baby aspirin 81 mg daily, given her history of carotid stenting.  Provide gabapentin 300 mg (up to three times daily) to reduce nerve pain until surgery.  Prescribe a short course of Norco (hydrocodone-acetaminophen) for breakthrough pain, with caution regarding total daily acetaminophen intake.   on postoperative recovery expectations: no lifting >10 lbs for the first month, avoiding bending/twisting at the waist, and progressively increasing activity as tolerated.  Arrange surgical scheduling and ensure her preoperative clearance, if needed, is up to date.  Instruct to return immediately if she experiences severe new symptoms (e.g., acute weakness, bowel/bladder dysfunction) prior to the scheduled surgery.    Gregorio Martin MD  Neurological Surgery    [facility] [address], [address]  [place] [address]  [phone]  Pager [phone]  1/22/2025 3:31 PM      This note was created using a voice-recognition transcribing system. Incorrect words or phrases may have been missed during proofreading. Please interpret accordingly.    Total Time     Established Patient Total Time       30  minutes.      Activities       Preparing to see the patient (chart/tests/imaging review).       Obtaining and/or reviewing separately obtained history.       Performing a medically appropriate examination and/or evaluation.       Counseling and educating the patient/family/caregiver.       Ordering medications, tests, or procedures.       Referring and communicating with other health care professionals (when not separately reported).       Documenting clincal information in the electronic or other health record.       Independently interpreting results (not separately reported).    Communicating results to the patient/family/caregiver.    Care coordination (not separately reported).       [1]   Allergies  Allergen Reactions    Sulfa Antibiotics    [2]   Current Outpatient Medications on File Prior to Visit   Medication Sig Dispense Refill    ALPRAZolam 0.5 MG Oral Tab Take 0.5 mg by mouth 2 (two) times daily.      gabapentin 100 MG Oral Cap Take 100 mg by mouth 2 (two) times daily.      Galcanezumab-gnlm (EMGALITY) 120 MG/ML Subcutaneous Solution Auto-injector Emgality Pen 120 mg/mL subcutaneous pen injector   INJECT 240 MG INTO THE SKIN EVERY 28 DAYS LOADING DOSE      rizatriptan 10 MG Oral Tablet Dispersible Place 1 Ring vaginally every 28 days.      ZAVZPRET 10 MG/ACT Nasal Solution 10 mg by Nasal route.      WEGOVY 2.4 MG/0.75ML Subcutaneous Solution Auto-injector INJECT 2.4 MG SUBCUTANEOUSLY ONE TIME PER WEEK FOR 84 DAYS      venlafaxine ER 37.5 MG Oral Capsule SR 24 Hr venlafaxine ER 37.5 mg capsule,extended release 24 hr      methylPREDNISolone 4 MG Oral Tablet Therapy Pack Take 1 Bottle by mouth As Directed.      estradiol 0.1 MG/GM Vaginal Cream Place 0.5 g vaginally twice a week.      ESTRING 7.5 MCG/24HR Vaginal Ring PLACE 1 RING VAGINALLY 1 TIME FOR 1 DOSE. REMOVE AFTER 3 MONTHS      Estradiol Starter Pack (IMVEXXY STARTER PACK) 4 MCG Vaginal Insert PLACE 4 MCG  VAGINALLY DAILY.      onabotulinumtoxinA (BOTOX) 200 units Injection Recon Soln       tobramycin 0.3 % Ophthalmic Solution 1 drop to the affected eye(s) qid until eye(s) are clear.      predniSONE 10 MG Oral Tab Take 7 tablets today,take 6 tablets tomorrow, then decrease number of tablets by one tablet each of the remaining days 28 tablet 0    traMADol 50 MG Oral Tab Take 1 tablet (50 mg total) by mouth every 6 (six) hours as needed for Pain. 60 tablet 0    nitrofurantoin monohydrate macro 100 MG Oral Cap Take 100 mg by mouth 2 (two) times daily.      Eletriptan Hydrobromide 40 MG Oral Tab 40 mg.      AJOVY 225 MG/1.5ML Subcutaneous Solution Auto-injector Inject 225 mg into the skin every 30 (thirty) days.      Meloxicam 15 MG Oral Tab Take 1 tablet (15 mg total) by mouth daily. 30 tablet 0    TOPIRAMATE 25 MG Oral Tab TAKE 1 TABLET (25 MG TOTAL) BY MOUTH EVERY MORNING AND 3 TABLETS (75 MG TOTAL) NIGHTLY. 360 tablet 3    venlafaxine 150 MG Oral Capsule SR 24 Hr       PANTOPRAZOLE 40 MG Oral Tab EC TAKE 1 TABLET (40 MG TOTAL) BY MOUTH 2 (TWO) TIMES DAILY BEFORE MEALS. 180 tablet 3    ubrogepant 100 MG Oral Tab Take 1 tablet PO at the onset of migraine.  May repeat 1 dose in 2 hours as needed.  Max 2 doses in 24 hours 10 tablet 1    semaglutide-weight management (WEGOVY) 0.5 MG/0.5ML Subcutaneous Solution Auto-injector 0.5 mg.      Phentermine HCl 15 MG Oral Cap  (Patient not taking: Reported on 12/26/2024)      ondansetron 4 MG Oral Tablet Dispersible Take 1 tablet (4 mg total) by mouth every 8 (eight) hours as needed for Nausea. 20 tablet 0    FLUTICASONE PROPIONATE 50 MCG/ACT Nasal Suspension SPRAY 2 SPRAYS INTO EACH NOSTRIL EVERY DAY 48 mL 1    Riboflavin 400 MG Oral Tab Take by mouth.      Coenzyme Q10 (CO Q 10 OR) Take 300 mg by mouth.      Rimegepant Sulfate (NURTEC) 75 MG Oral Tablet Dispersible Take 1 tablet by mouth daily as needed. 4 tablet 0    cyclobenzaprine 10 MG Oral Tab Take 1 tablet (10 mg total) by  mouth 3 (three) times daily as needed for Muscle spasms. 30 tablet 3    diazepam (VALIUM) 10 MG Oral Tab Take 1 tablet (10 mg total) by mouth every 12 (twelve) hours as needed (back spasms). 30 tablet 1    ergocalciferol 1.25 MG (39313 UT) Oral Cap Take 1 capsule (50,000 Units total) by mouth every 14 (fourteen) days. 6 capsule 3    Hyoscyamine Sulfate (LEVSIN) 0.125 MG Oral Tab Take 1 tablet (125 mcg total) by mouth every 6 (six) hours as needed for Cramping. 60 tablet 3    Aspirin 81 MG Oral Cap Take 81 mg by mouth daily.      FLUoxetine HCl 40 MG Oral Cap Take 40 mg by mouth once daily.  1    Meclizine HCl 25 MG Oral Tab Take 1 tablet (25 mg total) by mouth 3 (three) times daily as needed. 20 tablet 2    fexofenadine 180 MG Oral Tab Take 180 mg by mouth daily.      Pseudoephedrine HCl (SUDAFED OR) Take by mouth. 2 tabs bid prn      MULTI-VITAMIN OR TABS 1 TABLET DAILY       No current facility-administered medications on file prior to visit.

## 2025-01-24 NOTE — TELEPHONE ENCOUNTER
Surgery order signed.     CPT codes: 56398. 70517    Y pre-op apt scheduled (if sx is more than 30 days from last apt)  Y pre-op apt scheduled with RN spine navigator  Y Surgical instructions reviewed by nursing staff with patient  Y Surgery order signed   Y Placed sx on surgery sheet  Y Placed on outlook calendar  Y [employer] message sent to patient with sx instructions  Y Faxed pre-op clearance request to PCP Dr Muñoz   N/A Faxed letter to cardiologist  Y Post-op appointments made  Y PA [facility]. Routed to PA team to initiate.  Y Entire Neurosurgery Checklist Completed    Clearances: PCP  PA:[facility]  CPT Codes: 64152, 91361

## 2025-01-26 ENCOUNTER — PATIENT MESSAGE (OUTPATIENT)
Dept: SURGERY | Facility: CLINIC | Age: 50
End: 2025-01-26

## 2025-01-27 NOTE — TELEPHONE ENCOUNTER
Prior Authorization for MINIMALLY INVASIVE LEFT LUMBAR 4 TO 5, LUMBAR 5 TO SACRAL 1 LAMINOFORAMINOTOMIES AND PARTIAL MEDIAL FACETECTOMIES, LUMBAR 4 TO 5 MICRODISCECTOMY surgery initiated with [facility], called and spoke with Cheryl MEEK   Requesting coverage for:   Date of Service: 02/19/2025   Inpatient days requested: Outpatient   CPT codes: 62828, 55735     Request for surgery pending review, pending reference # [id number]. Clinical notes submitted by fax to: [phone], confirmation received.

## 2025-01-30 ENCOUNTER — MED REC SCAN ONLY (OUTPATIENT)
Dept: SURGERY | Facility: CLINIC | Age: 50
End: 2025-01-30

## 2025-01-31 NOTE — TELEPHONE ENCOUNTER
Message below noted.    Patient acknowledged and appreciative of message. Patient states in-laws will  handicap placard form.

## 2025-02-05 ENCOUNTER — TELEPHONE (OUTPATIENT)
Dept: SURGERY | Facility: CLINIC | Age: 50
End: 2025-02-05

## 2025-02-05 DIAGNOSIS — M48.061 STENOSIS OF LATERAL RECESS OF LUMBAR SPINE: ICD-10-CM

## 2025-02-05 DIAGNOSIS — M47.816 LUMBAR SPONDYLOSIS: ICD-10-CM

## 2025-02-05 DIAGNOSIS — M54.16 LUMBAR RADICULOPATHY: Primary | ICD-10-CM

## 2025-02-05 DIAGNOSIS — M51.362 DEGENERATION OF INTERVERTEBRAL DISC OF LUMBAR REGION WITH DISCOGENIC BACK PAIN AND LOWER EXTREMITY PAIN: ICD-10-CM

## 2025-02-05 NOTE — TELEPHONE ENCOUNTER
Patient calling to have a medication change. She states gabapentin and hydrocodone is not working for her. She states it worked for a bit but the nerve and back pain have increased. Please advise

## 2025-02-06 ENCOUNTER — PATIENT MESSAGE (OUTPATIENT)
Dept: SURGERY | Facility: CLINIC | Age: 50
End: 2025-02-06

## 2025-02-06 RX ORDER — OXYCODONE HYDROCHLORIDE 5 MG/1
TABLET ORAL EVERY 4 HOURS PRN
Qty: 30 TABLET | Refills: 0 | Status: SHIPPED | OUTPATIENT
Start: 2025-02-06

## 2025-02-06 RX ORDER — PREGABALIN 150 MG/1
150 CAPSULE ORAL 2 TIMES DAILY
Qty: 60 CAPSULE | Refills: 1 | Status: SHIPPED | OUTPATIENT
Start: 2025-02-06

## 2025-02-06 NOTE — TELEPHONE ENCOUNTER
Please see TE from 2/05/25.    \"Norco and gabapentin discontinued. I will  reviewed. Oxycodone and Lyrica sent to pharmacy for patient to try instead.\"    Advised patient of message and to let us know if there is anything else we can help her with.

## 2025-02-06 NOTE — TELEPHONE ENCOUNTER
Message below noted.    Please see [employer] message from 2/06/25. Surgery scheduled for 2/19/25.    Patient requesting a change in medication to get her through until surgery. Patient states Gabapentin and Norco is not working for her. Patient states it worked for a bit, but her nerve and back pain has increased.     Patient states pain is through her hip and leg and is requesting if there is anything else she can take for pain. Patient is not sleeping well, can't sit, stand, or lay down. She is struggling to find a comfortable position.     LOV 1/22/25  \"ASSESSMENT:  Ms. Gonzalez has severe left-sided radiculopathy resulting from a sizable disc herniation at L4-5 and associated degenerative changes at L5-S1. Her pain remains refractory to conservative management, including therapy, NSAIDs, muscle relaxants, gabapentin, and epidural steroid injection. She reports significant functional limitation and wishes to pursue definitive intervention. Given the imaging and her persistent symptoms, I believe she is an appropriate candidate for minimally invasive left-sided L4-5 and L5-S1 decompression to remove the compressive pathology and provide symptomatic relief. Although every surgery carries risks--including dural tear, infection, residual pain, or instability--the likelihood of meaningful improvement for her radiculopathy is high once direct nerve compression is relieved.     PLAN:  Proceed with a minimally invasive L4-5 and L5-S1 surgical decompression.  Continue baby aspirin 81 mg daily, given her history of carotid stenting.  Provide gabapentin 300 mg (up to three times daily) to reduce nerve pain until surgery.  Prescribe a short course of Norco (hydrocodone-acetaminophen) for breakthrough pain, with caution regarding total daily acetaminophen intake.   on postoperative recovery expectations: no lifting >10 lbs for the first month, avoiding bending/twisting at the waist, and progressively increasing activity as  tolerated.  Arrange surgical scheduling and ensure her preoperative clearance, if needed, is up to date.  Instruct to return immediately if she experiences severe new symptoms (e.g., acute weakness, bowel/bladder dysfunction) prior to the scheduled surgery.\"    Routed to Provider.

## 2025-02-06 NOTE — TELEPHONE ENCOUNTER
Norco and gabapentin discontinued.  I will  reviewed.  Oxycodone and Lyrica sent to pharmacy for patient to try instead.

## 2025-02-06 NOTE — TELEPHONE ENCOUNTER
Message below noted.    Please see TE from 2/05/25.    Advised patient message has been sent to Provider and we will reach back out with their recommendations once we get their response.

## 2025-02-06 NOTE — TELEPHONE ENCOUNTER
Message received from patient.     Clarified which prescriptions patient will take and which ones she will stop taking.

## 2025-02-06 NOTE — TELEPHONE ENCOUNTER
Message below noted.    Please see TE from 2/06/25.    Advised patient of message and to let us know if there is anything else we can help her with.     Nothing needed further with this encounter.

## 2025-02-07 ENCOUNTER — NURSE ONLY (OUTPATIENT)
Age: 50
End: 2025-02-07
Payer: COMMERCIAL

## 2025-02-07 DIAGNOSIS — Z71.9 ENCOUNTER FOR EDUCATION: Primary | ICD-10-CM

## 2025-02-07 NOTE — PROGRESS NOTES
RN Spine Navigator Education for Cristina Gonzalez     If you have received instructions from your surgeon that are different from those listed below, please follow your physician's instructions.    You are scheduled for a Lumbar decompression and Microdiscectomy with Dr. Martin on 2/19/25.      Patient Surgical Goals: Cristina has pain in her left lower back and legs. Her goal is pain relief and to be able to do her ADLs without symptoms.    Before Your Surgery    Choose a Care Partner  Patient attended spine navigator visit independently.  Care partner is her spouse Fred. Your care partner(s) should be able to provide transportation to and from the hospital. They should be able to help at home for the first week after discharge, including helping you with meals, medication, and dressing changes. It is strongly recommended that someone stays with you for 24 hours after anesthesia if going home the day of surgery.    Clearance Before Surgery  You will need to see your primary care provider within 30 days before surgery. Please make sure this appointment is at least a week before surgery as more testing or doctor visits may be ordered. Presurgical testing may include labs, nasal swab, imaging, EKG.   Make sure that you complete all preadmission testing so that surgery does not get delayed.    Home Environment  Assessed home status: home has stairs, bathroom and bedroom are upstairs, and patient has pets. Suggested arrangements for pet care and help at home.  It is recommended that you prepare your home by putting clean sheets on your bed, freezing meals, and putting frequently used items at waist level.   Prevent falls by removing items that could cause you to trip, adding nightlights and adding a nonskid mat in shower.   Assistive devices can be purchased at a medical supply store or online including canes, walkers, toileting devices (commodes, raised toilet seats, toilet paper wiping aid), long handled sponge, shower chair  or tub transfer bench, grabber/reacher tool, sock aid, long handled shoehorn, if needed.      Tobacco, Nicotine and Marijuana Use   Not applicable    Medications to Stop   For 7-10 days before surgery do not take any blood thinning medications. This includes non-steroidal anti-inflammatories or NSAIDs (Advil, Aleve, Motrin, ibuprofen, naproxen, meloxicam, diclofenac, celecoxib, etc.), aspirin (unless told otherwise by your cardiologist or surgeon), herbal supplements and vitamins (garlic, turmeric, vitamin E, fish oil or krill oil, etc.). You may only take Tylenol or prescribed narcotic medication if needed for pain.   Other medications to stop include: appetite suppressants GLP-1 day 24 hours before for daily or a week for weekly  Wegovy  Semiglutide    Leading Up to Day of Surgery  Five days before surgery wash with Hibiclens soap after your regular body soap every day. Do not put use Hibiclens on your face, hair or privates. Your last shower should be the night before surgery.  One business day before surgery, the preadmission testing staff will call you and let you know what time to arrive, where to park and will provide any additional instructions.   After 11pm the day before surgery, do not eat or drink anything (including water, gum, or candies) except for Tylenol and Gatorade.   Drink 12 ounces of regular Gatorade (NOT RED) 12 hours prior to your scheduled surgery time. Drink your second 12 ounces of regular Gatorade and take 1000 mg of Tylenol (acetaminophen) 4 hours before your scheduled surgery time.     Items to Bring to the Hospital   Bring insurance card, ID, advanced directive, or medical power of  paperwork, loose fitting clothing, shoes with a back that can accommodate swollen feet.  Do not bring jewelry, valuables, or medications.     In the Hospital     Operative Day and Hospital Stay  In the preoperative area, you will change into a gown, have an IV placed in your hand or arm by the  nurse, and sign any consent paperwork that is needed for your procedure. You will speak to the surgeon and anesthesiologist. It is important to tell the doctors and nurses if you have had any significant side effects from pain medications or anesthesia such as a rash or severe nausea.    In the operating room, the anesthesiologist will attach monitors, give you oxygen through a mask, and give you medicine through the IV to fall asleep. After you are sleeping, the breathing tube will be placed. The surgeon may use additional nerve monitoring during your surgery. This is to make sure that the muscles and nerves in your arms and legs are working normally as he operates. The equipment will be hooked up and removed while you are asleep. You will wake up on the stretcher.      During the surgery, your care partner can sit in the surgical waiting area. There are TV screens in that area that keep them informed of your progress.     In the recovery room, monitors will be attached that check your heart rate, blood pressure and oxygen levels. While you may not remember this part, a nurse is with you and constantly checking on your condition. Medications for pain and nausea will be given if you need them. Your family is not allowed in the recovery room. When you are ready to leave the recovery room, you will go to the same day surgery discharge area. Your family may join you there as you continue to wake up. You will be offered something to eat and drink and be given pain pills if needed. You will get your discharge instructions from the nurse and go home from there.  Preventing surgical complications  It is important to follow all instructions before and after surgery to decrease the risks of surgery and prevent complications.     Blood clots: walk and do ankle pumps at home  Infection: wash with Hibiclens before surgery, wash your hands, don't touch your incision  Pneumonia: take deep breaths and cough  Nausea/vomiting: start  with liquids and small meals and do not take pills on an empty stomach  Constipation: drink water and walk frequently  If you are prone to constipation, start an over the counter stool softener while taking your narcotic medication.  After surgery if you have gone 3-4 days without a bowel movement, we recommend you use either a suppository or an enema (follow the packaging instructions for use). The longer you stay constipated the more painful and difficult it will be to relieve your constipation.       At home     Understanding Pain After Surgery  We will do our best to manage your pain after surgery, but it is not possible to be completely pain-free. There will be operative pain in your back or neck. Pain in the arms or legs may be one of the first things to improve. Numbness, weakness, and tingling should improve over time. However, there can be a temporary increase in symptoms in the first few days due to inflammation from surgery.   Pain medications will be prescribed to take home at discharge. The goal of pain medicine after surgery is to make your pain tolerable, not to make you pain free. We encourage you to use the medication prescribed to you after your surgery, but please take the lowest possible dose to manage your pain. Taking high doses of narcotics can cause side effects. Transition to plain Tylenol when your pain improves. You may get more continuous pain relief by alternating between medications if you have multiple instead of taking them at the same time. Write down when you have taken a medication as it may be difficult to remember after a few doses.    Post operative medication   Tylenol (acetaminophen): take for pain. Do not take more than 3000 mg - 4000 mg in 24 hours because it can damage your liver.   Narcotics: take for moderate to severe pain. Do not drink alcohol or drive while taking narcotics. Some narcotic medications (Norco, Tylenol #3, Percocet, Vicodin) contain Tylenol  (acetaminophen). Make sure to not exceed the maximum dose if you are taking additional Tylenol with these medications.  Muscle relaxers: take for muscle cramping. These can cause drowsiness.    NSAIDS (Advil, Aleve, Motrin, ibuprofen, naproxen, meloxicam, diclofenac, celecoxib, etc.) or aspirin: Do not take these unless your physician says it is ok. For a fusion, it may be several months before you can take NSAIDS.  Stool softeners: take to prevent constipation while you are taking narcotic medications. You can get these over the counter at the pharmacy. You may use laxatives, which are stronger, if needed.    If you believe you will need more of medication your surgeon has prescribed to you, request a refill through your pharmacy or through the refill request in [employer] (log in, go to medications, then select refill request) at least two business days before you run out of medication.     Nonpharmacologic pain management   You may use ice on your incision for 20 minutes every hour to help with pain and swelling. Do not place ice directly on your skin. You can use heat to sooth your muscles but avoid placing heat directly on your incision. Make frequent position changes. You can do gentle stretching while avoiding significant bending or twisting. Use deep breathing techniques and distractions such as TV, music, reading, or games.     Movement restrictions  After surgery, no bending or twisting your neck or back. Do not lift anything over 10lbs (about the weight of a gallon of milk) or lift anything over your shoulders. Avoid pulling or pushing. You may use stairs while holding the handrail.  It is ok to ride in the car but refrain from driving or traveling long distances until cleared to do so by your surgeon. You may not drive while taking narcotics or muscle relaxants.      Post Op Exercise   Walk frequently. Start with walking short distances and increase as you start to feel better. Do ankle pumps (bending at  your ankles, bring your feet towards your head then point them towards the ground) 15-20 times every hour when awake to help prevent blood clots.     Your surgeon will let you know at your post operative appointment if you are ready to decrease your movement restrictions and increase your exercise. If you have questions in between appointments about lessening your restrictions, please contact the office.     You and your doctor will discuss how you are feeling as you heal and decide if outpatient physical therapy or a medical fitness referral is needed.    Caring for your incision  Always wash your hands before touching your incision. Your incision will be closed with sutures under the skin and skin glue or Steri-Strips (thin white bandages) on top of the skin. Do not attempt to peal off Skin glue/Steri-Strips as they will come off on their own. If the incision is closed with sutures or staples on top of the skin, they will be removed at a post op appointment. The incision may be covered with a gauze dressing that can come off after three days. Once the original gauze dressing is removed, we prefer that you leave your incision open to air (without a gauze dressing). If the incision has drainage or is rubbing against your clothes or brace, you may place gauze and medical tape over it. Change the gauze and medical tape daily. Look at your incision daily to check for signs of infection.  You can shower three days after your surgery or sooner if your surgeon allows. We recommend the care partner be present during the first shower for safety. Let soapy water run over the incision, but do not scrub it or spray it directly. Gently pat it dry after with a clean towel. Do not apply any creams or lotions to the incision. Do not soak in a tub, pool, or any body of water until your incision is fully healed.    Signs of Infection   Check your incision daily for swelling, redness, drainage, pus, bad smell, or opening of the  incision.     When to Call for Assistance  Call the Neurosurgery Office ([phone] Option #2) if you experience signs of infection, opening of the incision, continuous nausea or vomiting, poor pain control despite using the pain medication as directed, a sudden increase in pain, temperature over 101F, or with any concerns, unanswered questions, or new problems, such as new numbness/weakness/tingling.  Call 911 or go to the nearest emergency room if you experience chest pain, difficulty breathing, loss of bowel or bladder control, extreme drowsiness, or any other life-threatening situation.     Please remember that the office of Dr. Martin is closed on the weekends, holidays, and there is no one in the office from 4:30 pm to 8 am. If you have an urgent matter that needs attention you will need to go to the emergency room or immediate care for assistance. If it is a time sensitive during work hours please make sure to call the office of Dr. Martin as [employer] messages are not meant for Urgent/Emergent situations. [employer] messages can take 5-7 days for a response.      Follow-up Plan   Appointments with Dr. Martin or David at 2-3, 4-6 and 12 weeks    Answered questions regarding: Cristina was very pleasant to speak with today, she acknowledged all information provided and had no additional questions at this time.     You can contact the RN Spine Navigator at [phone] or [email or web address] with additional questions or feedback on your care. It may take several business days to receive a reply so please do not call the RN spine navigator for refills or for emergencies.    Spine navigator spent 23 minutes conducting a virtual visit to provide education. Thank you for letting the RN Spine Navigator participate in your care.

## 2025-02-07 NOTE — TELEPHONE ENCOUNTER
Message received from patient.    Advised patient PA has been initiated for Oxycodone. Advised patient that the Pharmacy will reach out once it is ready for pick-up.

## 2025-02-10 NOTE — TELEPHONE ENCOUNTER
Per PCP's OV note found in care everywhere, \"Per ACC/ AHA guidelines, this patient may proceed to surgery without further risk stratification.\"     Nothing further needed.

## 2025-02-10 NOTE — TELEPHONE ENCOUNTER
Prior authorization request completed for: Minimally Evasive Left L4-5. L5-S1 Laminectomies and L4-5 Discectomy    Authorization # [id number]  Authorization dates: 02/19/2025-05/20/2025  CPT codes approved: 73726, 36941  Number of visits/dates of service approved: Outpatient   Physician: Dr. Martin  Location: [facility]

## 2025-02-18 ENCOUNTER — LAB ENCOUNTER (OUTPATIENT)
Dept: LAB | Age: 50
End: 2025-02-18
Attending: STUDENT IN AN ORGANIZED HEALTH CARE EDUCATION/TRAINING PROGRAM
Payer: COMMERCIAL

## 2025-02-18 DIAGNOSIS — Z01.818 PREOP TESTING: ICD-10-CM

## 2025-02-18 LAB
ANTIBODY SCREEN: NEGATIVE
RH BLOOD TYPE: POSITIVE

## 2025-02-18 PROCEDURE — 86901 BLOOD TYPING SEROLOGIC RH(D): CPT

## 2025-02-18 PROCEDURE — 36415 COLL VENOUS BLD VENIPUNCTURE: CPT

## 2025-02-18 PROCEDURE — 86900 BLOOD TYPING SEROLOGIC ABO: CPT

## 2025-02-18 PROCEDURE — 86850 RBC ANTIBODY SCREEN: CPT

## 2025-02-18 NOTE — DISCHARGE INSTRUCTIONS
Minimally Invasive Lumbar Microdiscectomy/Laminectomy Discharge Instructions  (Dr. Martin)    Activity  Restrictions  No twisting, pulling, pushing or lifting > 10 lbs for the first month,  > 20 lbs for the second month, > 30 lbs for the third month.  No lifting anything over your head.    Sitting  Sit with your knees at about the same level as your hips; use a footstool if needed.  Do not sit in soft or overstuffed chairs. Firm chairs with straight backs give better support.   Recliners are OK but may need to add pillows in order to not sink into the chair.  Use a raised toilet seat if needed.  Change position every 20-30 minutes when sitting (example: after sitting, stand, then you can sit again for another 20-30 minutes).    Walking is your best exercise.  Listen to your body.  Walk several times a day  Smaller distances, but frequently are better.  Your goal is to increase the distance you walk each day.  Remember to listen to your body    Sex  After 2 weeks, when comfortable and approved by your surgeon.  Stop if causing pain.    Driving  Usually allowed after 1-2 weeks; so long as you're not under the influence of narcotics (check with physician at first office visit).  Do Not drive while taking narcotics or muscle relaxants.  Adhere to sitting restrictions.    Stairs  Climb stairs as needed    Incision site care and dressing  Changes as directed by your surgeon    YOUR INCISION IS CLOSED WITH ABSORBABLE SUTURES AND SKIN GLUE  May leave open to air. A clean 4x4 gauze may prevent clothing from rubbing incision.   Watch incision for any redness, drainage, increased warmth or opening of the incision. Call surgeon if you notice any of these.  No lotions or ointments on or near incision.  Always wash hands before and after touching incision.    Bathing  No tub baths, pools, or saunas for 6 weeks and until cleared by surgeon.  When able to shower, you may have water run down over the incision but do not scrub or  pick off the glue.  After showering, pat incision dry and may apply and allow for incision to dry thoroughly before covering it.  Do not apply any lotions or ointments to incision.     Return to work  When cleared by surgeon. Discuss specific work activities with your surgeon at follow up visit.  Light to sedentary work may be possible 2-3 weeks post op  Heavy work may be possible 6 weeks post op.    Sleeping  Use a firm mattress.  Lay on side or back, not stomach.  May use pillow under knees, between legs or behind back if lying on your side.    Diet and constipation prevention  Drink six to eight glasses liquid (water, juice) per day.  Eat a high fiber diet (bran, vegetables, fruit).  Use an over the counter stool softener such as Colace or senakot while taking narcotics to prevent constipation; use laxatives such as Miralax or Milk of Magnesia as needed.  An enema or suppository may be necessary if above measures do not work.    No smoking  Smoking will inhibit healing.  Even one cigarette a day will cause problems.  Chewing tobacco, nicotine gum or patches will also inhibit healing.    Pain Management  Relaxation techniques  A way to focus your attention other than on your pain. Your brain makes endorphins that are a natural body chemical that can decrease pain. Some examples of relaxation techniques are deep breathing, listening to music or meditating.  May use Cold therapy for 20 minutes multiple times per day.  Be sure there is a cloth barrier between skin and cold therapy.  Medication  Tylenol (acetaminophen) and Motrin (ibuprofen) are preferred. Caution if your pain med contains Tylenol (acetaminophen); maximum 3.5 gms of Tylenol (acetaminophen) in 24 hours. You can alternate between Tylenol and Motrin every 4 hrs.  A single aspirin (81 mg) for the heart is ok if ordered by your physician.  Take muscle relaxants and pain medications as prescribed allowing 30-45 minutes to take effect.  Do NOT take alcohol  while on pain medication.  Monitor need for pain medication closely  Call pharmacy or physician office at least five days before out of pain medication. If calling physician office after 3 pm, it will be handled on the next business day.    Post op office visit  Attend your follow up, already scheduled after surgery as directed at discharge  Schedule one week follow up with Primary Care Physician. Review all medications.    When to contact your surgeon  Temp > 101F; Take your temperature twice a day  Increased pain, swelling, redness, or any drainage to incision.  Separation of incision.  Sudden reappearance of pain that won’t go away with pain medication.  New numbness or weakness to arms or legs.  Difficulty urinating or having bowel movements  Headaches that worsen when standing and resolve when laying flat.    Go directly to the ER or CALL 911 if you:  become short of breath  have chest pain  cough up blood  have unexplained anxiety with breathing     HOME INSTRUCTIONS  [facility] HOME CARE INSTRUCTIONS: POST-OP ANESTHESIA  The medication that you received for sedation or general anesthesia can last up to 24 hours. Your judgment and reflexes may be altered, even if you feel like your normal self.      We Recommend:   Do not drive any motor vehicle or bicycle   Avoid mowing the lawn, playing sports, or working with power tools/applicances (power saws, electric knives or mixers)   That you have someone stay with you on your first night home   Do not drink alcohol or take sleeping pills or tranquilizers   Do not sign legal documents within 24 hours of your procedure   If you had a nerve block for your surgery, take extra care not to put any pressure on your arm or hand for 24 hours    It is normal:  For you to have a sore throat if you had a breathing tube during surgery (while you were asleep!). The sore throat should get better within 48 hours. You can gargle with warm salt water (1/2 tsp in 4 oz warm water) or use  a throat lozenge for comfort  To feel muscle aches or soreness especially in the abdomen, chest or neck. The achy feeling should go away in the next 24 hours  To feel weak, sleepy or \"wiped out\". Your should start feeling better in the next 24 hours.   To experience mild discomforts such as sore lip or tongue, headache, cramps, gas pains or a bloated feeling in your abdomen.   To experience mild back pain or soreness for a day or two if you had spinal or epidural anesthesia.   If you had laparoscopic surgery, to feel shoulder pain or discomfort on the day of surgery.   For some patients to have nausea after surgery/anesthesia    If you feel nausea or experience vomiting:   Try to move around less.   Eat less than usual or drink only liquids until the next morning   Nausea should resolve in about 24 hours    If you have a problem when you are at home:    Call your surgeons office   Discharge Instructions: After Your Surgery  You’ve just had surgery. During surgery, you were given medicine called anesthesia to keep you relaxed and free of pain. After surgery, you may have some pain or nausea. This is common. Here are some tips for feeling better and getting well after surgery.   Going home  Your healthcare provider will show you how to take care of yourself when you go home. They'll also answer your questions. Have an adult family member or friend drive you home. For the first 24 hours after your surgery:   Don't drive or use heavy equipment.  Don't make important decisions or sign legal papers.  Take medicines as directed.  Don't drink alcohol.  Have someone stay with you, if needed. They can watch for problems and help keep you safe.  Be sure to go to all follow-up visits with your healthcare provider. And rest after your surgery for as long as your provider tells you to.   Coping with pain  If you have pain after surgery, pain medicine will help you feel better. Take it as directed, before pain becomes severe.  Also, ask your healthcare provider or pharmacist about other ways to control pain. This might be with heat, ice, or relaxation. And follow any other instructions your surgeon or nurse gives you.      Stay on schedule with your medicine.     Tips for taking pain medicine  To get the best relief possible, remember these points:   Pain medicines can upset your stomach. Taking them with a little food may help.  Most pain relievers taken by mouth need at least 20 to 30 minutes to start to work.  Don't wait till your pain becomes severe before you take your medicine. Try to time your medicine so that you can take it before starting an activity. This might be before you get dressed, go for a walk, or sit down for dinner.  Constipation is a common side effect of some pain medicines. Call your healthcare provider before taking any medicines such as laxatives or stool softeners to help ease constipation. Also ask if you should skip any foods. Drinking lots of fluids and eating foods such as fruits and vegetables that are high in fiber can also help. Remember, don't take laxatives unless your surgeon has prescribed them.  Drinking alcohol and taking pain medicine can cause dizziness and slow your breathing. It can even be deadly. Don't drink alcohol while taking pain medicine.  Pain medicine can make you react more slowly to things. Don't drive or run machinery while taking pain medicine.  Your healthcare provider may tell you to take acetaminophen to help ease your pain. Ask them how much you're supposed to take each day. Acetaminophen or other pain relievers may interact with your prescription medicines or other over-the-counter (OTC) medicines. Some prescription medicines have acetaminophen and other ingredients in them. Using both prescription and OTC acetaminophen for pain can cause you to accidentally overdose. Read the labels on your OTC medicines with care. This will help you to clearly know the list of ingredients,  how much to take, and any warnings. It may also help you not take too much acetaminophen. If you have questions or don't understand the information, ask your pharmacist or healthcare provider to explain it to you before you take the OTC medicine.   Managing nausea  Some people have an upset stomach (nausea) after surgery. This is often because of anesthesia, pain, or pain medicine, less movement of food in the stomach, or the stress of surgery. These tips will help you handle nausea and eat healthy foods as you get better. If you were on a special food plan before surgery, ask your healthcare provider if you should follow it while you get better. Check with your provider on how your eating should progress. It may depend on the surgery you had. These general tips may help:   Don't push yourself to eat. Your body will tell you when to eat and how much.  Start off with clear liquids and soup. They're easier to digest.  Next try semi-solid foods as you feel ready. These include mashed potatoes, applesauce, and gelatin.  Slowly move to solid foods. Don’t eat fatty, rich, or spicy foods at first.  Don't force yourself to have 3 large meals a day. Instead eat smaller amounts more often.  Take pain medicines with a small amount of solid food, such as crackers or toast. This helps prevent nausea.  When to call your healthcare provider  Call your healthcare provider right away if you have any of these:   You still have too much pain, or the pain gets worse, after taking the medicine. The medicine may not be strong enough. Or there may be a complication from the surgery.  You feel too sleepy, dizzy, or groggy. The medicine may be too strong.  Side effects such as nausea or vomiting. Your healthcare provider may advise taking other medicines to .  Skin changes such as rash, itching, or hives. This may mean you have an allergic reaction. Your provider may advise taking other medicines.  The incision looks different (for  instance, part of it opens up).  Bleeding or fluid leaking from the incision site, and weren't told to expect that.  Fever of 100.4°F (38°C) or higher, or as directed by your provider.  Call 911  Call 911 right away if you have:   Trouble breathing  Facial swelling    If you have obstructive sleep apnea   You were given anesthesia medicine during surgery to keep you comfortable and free of pain. After surgery, you may have more apnea spells because of this medicine and other medicines you were given. The spells may last longer than normal.    At home:  Keep using the continuous positive airway pressure (CPAP) device when you sleep. Unless your healthcare provider tells you not to, use it when you sleep, day or night. CPAP is a common device used to treat obstructive sleep apnea.  Talk with your provider before taking any pain medicine, muscle relaxants, or sedatives. Your provider will tell you about the possible dangers of taking these medicines.  Contact your provider if your sleeping changes a lot even when taking medicines as directed.  [employer] last reviewed this educational content on 10/1/2021  © 5463-0881 [employer]. All rights reserved. This information is not intended as a substitute for professional medical care. Always follow your healthcare professional's instructions.

## 2025-02-19 ENCOUNTER — APPOINTMENT (OUTPATIENT)
Dept: GENERAL RADIOLOGY | Facility: HOSPITAL | Age: 50
End: 2025-02-19
Attending: STUDENT IN AN ORGANIZED HEALTH CARE EDUCATION/TRAINING PROGRAM
Payer: COMMERCIAL

## 2025-02-19 ENCOUNTER — HOSPITAL ENCOUNTER (OUTPATIENT)
Facility: HOSPITAL | Age: 50
Setting detail: HOSPITAL OUTPATIENT SURGERY
Discharge: HOME | End: 2025-02-19
Attending: STUDENT IN AN ORGANIZED HEALTH CARE EDUCATION/TRAINING PROGRAM | Admitting: STUDENT IN AN ORGANIZED HEALTH CARE EDUCATION/TRAINING PROGRAM
Payer: COMMERCIAL

## 2025-02-19 ENCOUNTER — ANESTHESIA EVENT (OUTPATIENT)
Dept: SURGERY | Facility: HOSPITAL | Age: 50
End: 2025-02-19
Payer: COMMERCIAL

## 2025-02-19 ENCOUNTER — ANESTHESIA (OUTPATIENT)
Dept: SURGERY | Facility: HOSPITAL | Age: 50
End: 2025-02-19
Payer: COMMERCIAL

## 2025-02-19 VITALS
HEIGHT: 68.5 IN | OXYGEN SATURATION: 98 % | TEMPERATURE: 98 F | WEIGHT: 153 LBS | RESPIRATION RATE: 18 BRPM | BODY MASS INDEX: 22.92 KG/M2 | HEART RATE: 102 BPM | SYSTOLIC BLOOD PRESSURE: 98 MMHG | DIASTOLIC BLOOD PRESSURE: 58 MMHG

## 2025-02-19 DIAGNOSIS — M47.816 LUMBAR SPONDYLOSIS: ICD-10-CM

## 2025-02-19 DIAGNOSIS — Z98.890 S/P LUMBAR MICRODISCECTOMY: ICD-10-CM

## 2025-02-19 DIAGNOSIS — M54.16 LUMBAR RADICULOPATHY: ICD-10-CM

## 2025-02-19 DIAGNOSIS — M48.061 STENOSIS OF LATERAL RECESS OF LUMBAR SPINE: ICD-10-CM

## 2025-02-19 DIAGNOSIS — M51.16 LUMBAR DISC HERNIATION WITH RADICULOPATHY: ICD-10-CM

## 2025-02-19 DIAGNOSIS — M51.362 DEGENERATION OF INTERVERTEBRAL DISC OF LUMBAR REGION WITH DISCOGENIC BACK PAIN AND LOWER EXTREMITY PAIN: ICD-10-CM

## 2025-02-19 DIAGNOSIS — Z01.818 PREOP TESTING: Primary | ICD-10-CM

## 2025-02-19 PROCEDURE — 01NB0ZZ RELEASE LUMBAR NERVE, OPEN APPROACH: ICD-10-PCS | Performed by: STUDENT IN AN ORGANIZED HEALTH CARE EDUCATION/TRAINING PROGRAM

## 2025-02-19 PROCEDURE — 63030 LAMOT DCMPRN NRV RT 1 LMBR: CPT | Performed by: STUDENT IN AN ORGANIZED HEALTH CARE EDUCATION/TRAINING PROGRAM

## 2025-02-19 PROCEDURE — 76000 FLUOROSCOPY <1 HR PHYS/QHP: CPT | Performed by: STUDENT IN AN ORGANIZED HEALTH CARE EDUCATION/TRAINING PROGRAM

## 2025-02-19 PROCEDURE — 0ST20ZZ RESECTION OF LUMBAR VERTEBRAL DISC, OPEN APPROACH: ICD-10-PCS | Performed by: STUDENT IN AN ORGANIZED HEALTH CARE EDUCATION/TRAINING PROGRAM

## 2025-02-19 PROCEDURE — 63035 LAMOT DCMPRN NRV RT EA ADDL: CPT | Performed by: STUDENT IN AN ORGANIZED HEALTH CARE EDUCATION/TRAINING PROGRAM

## 2025-02-19 RX ORDER — OXYCODONE HYDROCHLORIDE 5 MG/1
5 TABLET ORAL EVERY 4 HOURS PRN
Qty: 30 TABLET | Refills: 0 | Status: SHIPPED | OUTPATIENT
Start: 2025-02-19

## 2025-02-19 RX ORDER — ROCURONIUM BROMIDE 10 MG/ML
INJECTION, SOLUTION INTRAVENOUS AS NEEDED
Status: DISCONTINUED | OUTPATIENT
Start: 2025-02-19 | End: 2025-02-19 | Stop reason: SURG

## 2025-02-19 RX ORDER — ONDANSETRON 2 MG/ML
INJECTION INTRAMUSCULAR; INTRAVENOUS AS NEEDED
Status: DISCONTINUED | OUTPATIENT
Start: 2025-02-19 | End: 2025-02-19 | Stop reason: SURG

## 2025-02-19 RX ORDER — METHOCARBAMOL 500 MG/1
500 TABLET, FILM COATED ORAL 3 TIMES DAILY PRN
Qty: 60 TABLET | Refills: 0 | Status: SHIPPED | OUTPATIENT
Start: 2025-02-19

## 2025-02-19 RX ORDER — LIDOCAINE HYDROCHLORIDE 10 MG/ML
INJECTION, SOLUTION EPIDURAL; INFILTRATION; INTRACAUDAL; PERINEURAL AS NEEDED
Status: DISCONTINUED | OUTPATIENT
Start: 2025-02-19 | End: 2025-02-19 | Stop reason: SURG

## 2025-02-19 RX ORDER — EPHEDRINE SULFATE 50 MG/ML
INJECTION, SOLUTION INTRAVENOUS AS NEEDED
Status: DISCONTINUED | OUTPATIENT
Start: 2025-02-19 | End: 2025-02-19 | Stop reason: SURG

## 2025-02-19 RX ORDER — NALOXONE HYDROCHLORIDE 0.4 MG/ML
0.08 INJECTION, SOLUTION INTRAMUSCULAR; INTRAVENOUS; SUBCUTANEOUS AS NEEDED
Status: DISCONTINUED | OUTPATIENT
Start: 2025-02-19 | End: 2025-02-19

## 2025-02-19 RX ORDER — KETAMINE HYDROCHLORIDE 50 MG/ML
INJECTION, SOLUTION INTRAMUSCULAR; INTRAVENOUS AS NEEDED
Status: DISCONTINUED | OUTPATIENT
Start: 2025-02-19 | End: 2025-02-19 | Stop reason: SURG

## 2025-02-19 RX ORDER — MIDAZOLAM HYDROCHLORIDE 1 MG/ML
INJECTION INTRAMUSCULAR; INTRAVENOUS AS NEEDED
Status: DISCONTINUED | OUTPATIENT
Start: 2025-02-19 | End: 2025-02-19 | Stop reason: SURG

## 2025-02-19 RX ORDER — BUPIVACAINE HYDROCHLORIDE 2.5 MG/ML
INJECTION, SOLUTION EPIDURAL; INFILTRATION; INTRACAUDAL AS NEEDED
Status: DISCONTINUED | OUTPATIENT
Start: 2025-02-19 | End: 2025-02-19 | Stop reason: HOSPADM

## 2025-02-19 RX ORDER — PROCHLORPERAZINE EDISYLATE 5 MG/ML
5 INJECTION INTRAMUSCULAR; INTRAVENOUS EVERY 8 HOURS PRN
Status: DISCONTINUED | OUTPATIENT
Start: 2025-02-19 | End: 2025-02-19

## 2025-02-19 RX ORDER — HYDROMORPHONE HYDROCHLORIDE 1 MG/ML
0.2 INJECTION, SOLUTION INTRAMUSCULAR; INTRAVENOUS; SUBCUTANEOUS EVERY 5 MIN PRN
Status: DISCONTINUED | OUTPATIENT
Start: 2025-02-19 | End: 2025-02-19

## 2025-02-19 RX ORDER — HYDROMORPHONE HYDROCHLORIDE 1 MG/ML
0.4 INJECTION, SOLUTION INTRAMUSCULAR; INTRAVENOUS; SUBCUTANEOUS EVERY 5 MIN PRN
Status: DISCONTINUED | OUTPATIENT
Start: 2025-02-19 | End: 2025-02-19

## 2025-02-19 RX ORDER — SODIUM CHLORIDE, SODIUM LACTATE, POTASSIUM CHLORIDE, CALCIUM CHLORIDE 600; 310; 30; 20 MG/100ML; MG/100ML; MG/100ML; MG/100ML
INJECTION, SOLUTION INTRAVENOUS CONTINUOUS
Status: DISCONTINUED | OUTPATIENT
Start: 2025-02-19 | End: 2025-02-19

## 2025-02-19 RX ORDER — MORPHINE SULFATE 10 MG/ML
6 INJECTION, SOLUTION INTRAMUSCULAR; INTRAVENOUS EVERY 10 MIN PRN
Status: DISCONTINUED | OUTPATIENT
Start: 2025-02-19 | End: 2025-02-19

## 2025-02-19 RX ORDER — MAGNESIUM SULFATE HEPTAHYDRATE 500 MG/ML
INJECTION, SOLUTION INTRAMUSCULAR; INTRAVENOUS AS NEEDED
Status: DISCONTINUED | OUTPATIENT
Start: 2025-02-19 | End: 2025-02-19 | Stop reason: SURG

## 2025-02-19 RX ORDER — ONDANSETRON 2 MG/ML
4 INJECTION INTRAMUSCULAR; INTRAVENOUS EVERY 6 HOURS PRN
Status: DISCONTINUED | OUTPATIENT
Start: 2025-02-19 | End: 2025-02-19

## 2025-02-19 RX ORDER — HYDROMORPHONE HYDROCHLORIDE 1 MG/ML
0.6 INJECTION, SOLUTION INTRAMUSCULAR; INTRAVENOUS; SUBCUTANEOUS EVERY 5 MIN PRN
Status: DISCONTINUED | OUTPATIENT
Start: 2025-02-19 | End: 2025-02-19

## 2025-02-19 RX ORDER — DEXAMETHASONE SODIUM PHOSPHATE 4 MG/ML
VIAL (ML) INJECTION AS NEEDED
Status: DISCONTINUED | OUTPATIENT
Start: 2025-02-19 | End: 2025-02-19 | Stop reason: SURG

## 2025-02-19 RX ORDER — MORPHINE SULFATE 4 MG/ML
2 INJECTION, SOLUTION INTRAMUSCULAR; INTRAVENOUS EVERY 10 MIN PRN
Status: DISCONTINUED | OUTPATIENT
Start: 2025-02-19 | End: 2025-02-19

## 2025-02-19 RX ORDER — ACETAMINOPHEN 500 MG
1000 TABLET ORAL ONCE
Status: DISCONTINUED | OUTPATIENT
Start: 2025-02-19 | End: 2025-02-19 | Stop reason: HOSPADM

## 2025-02-19 RX ORDER — METHYLPREDNISOLONE ACETATE 40 MG/ML
INJECTION, SUSPENSION INTRA-ARTICULAR; INTRALESIONAL; INTRAMUSCULAR; SOFT TISSUE AS NEEDED
Status: DISCONTINUED | OUTPATIENT
Start: 2025-02-19 | End: 2025-02-19 | Stop reason: HOSPADM

## 2025-02-19 RX ORDER — MORPHINE SULFATE 4 MG/ML
4 INJECTION, SOLUTION INTRAMUSCULAR; INTRAVENOUS EVERY 10 MIN PRN
Status: DISCONTINUED | OUTPATIENT
Start: 2025-02-19 | End: 2025-02-19

## 2025-02-19 RX ORDER — ACETAMINOPHEN 500 MG
500 TABLET ORAL EVERY 4 HOURS PRN
Qty: 120 TABLET | Refills: 0 | Status: SHIPPED | OUTPATIENT
Start: 2025-02-19

## 2025-02-19 RX ORDER — PHENYLEPHRINE HCL 10 MG/ML
VIAL (ML) INJECTION AS NEEDED
Status: DISCONTINUED | OUTPATIENT
Start: 2025-02-19 | End: 2025-02-19 | Stop reason: SURG

## 2025-02-19 RX ADMIN — PHENYLEPHRINE HCL 300 MCG: 10 MG/ML VIAL (ML) INJECTION at 09:10:00

## 2025-02-19 RX ADMIN — PHENYLEPHRINE HCL 300 MCG: 10 MG/ML VIAL (ML) INJECTION at 09:48:00

## 2025-02-19 RX ADMIN — EPHEDRINE SULFATE 25 MG: 50 INJECTION, SOLUTION INTRAVENOUS at 08:39:00

## 2025-02-19 RX ADMIN — PHENYLEPHRINE HCL 300 MCG: 10 MG/ML VIAL (ML) INJECTION at 08:53:00

## 2025-02-19 RX ADMIN — PHENYLEPHRINE HCL 100 MCG: 10 MG/ML VIAL (ML) INJECTION at 09:04:00

## 2025-02-19 RX ADMIN — ROCURONIUM BROMIDE 30 MG: 10 INJECTION, SOLUTION INTRAVENOUS at 08:53:00

## 2025-02-19 RX ADMIN — ONDANSETRON 4 MG: 2 INJECTION INTRAMUSCULAR; INTRAVENOUS at 10:25:00

## 2025-02-19 RX ADMIN — MAGNESIUM SULFATE HEPTAHYDRATE 1 G: 500 INJECTION, SOLUTION INTRAMUSCULAR; INTRAVENOUS at 07:43:00

## 2025-02-19 RX ADMIN — LIDOCAINE HYDROCHLORIDE 100 MG: 10 INJECTION, SOLUTION EPIDURAL; INFILTRATION; INTRACAUDAL; PERINEURAL at 08:09:00

## 2025-02-19 RX ADMIN — PHENYLEPHRINE HCL 100 MCG: 10 MG/ML VIAL (ML) INJECTION at 08:43:00

## 2025-02-19 RX ADMIN — ROCURONIUM BROMIDE 50 MG: 10 INJECTION, SOLUTION INTRAVENOUS at 07:37:00

## 2025-02-19 RX ADMIN — ROCURONIUM BROMIDE 20 MG: 10 INJECTION, SOLUTION INTRAVENOUS at 10:04:00

## 2025-02-19 RX ADMIN — MIDAZOLAM HYDROCHLORIDE 2 MG: 1 INJECTION INTRAMUSCULAR; INTRAVENOUS at 07:37:00

## 2025-02-19 RX ADMIN — KETAMINE HYDROCHLORIDE 30 MG: 50 INJECTION, SOLUTION INTRAMUSCULAR; INTRAVENOUS at 07:43:00

## 2025-02-19 RX ADMIN — PHENYLEPHRINE HCL 300 MCG: 10 MG/ML VIAL (ML) INJECTION at 10:25:00

## 2025-02-19 RX ADMIN — LIDOCAINE HYDROCHLORIDE 100 MG: 10 INJECTION, SOLUTION EPIDURAL; INFILTRATION; INTRACAUDAL; PERINEURAL at 07:37:00

## 2025-02-19 RX ADMIN — DEXAMETHASONE SODIUM PHOSPHATE 4 MG: 4 MG/ML VIAL (ML) INJECTION at 07:37:00

## 2025-02-19 RX ADMIN — PHENYLEPHRINE HCL 300 MCG: 10 MG/ML VIAL (ML) INJECTION at 09:33:00

## 2025-02-19 RX ADMIN — PHENYLEPHRINE HCL 300 MCG: 10 MG/ML VIAL (ML) INJECTION at 10:04:00

## 2025-02-19 NOTE — BRIEF OP NOTE
Pre-Operative Diagnosis: Degeneration of intervertebral disc of lumbar region with discogenic back pain and lower extremity pain [M51.362]  Lumbar radiculopathy [M54.16]  Lumbar spondylosis [M47.816]  Stenosis of lateral recess of lumbar spine [M48.061]  Lumbar disc herniation with radiculopathy [M51.16]     Post-Operative Diagnosis: Degeneration of intervertebral disc of lumbar region with discogenic back pain and lower extremity pain[M51.362]Lumbarradiculopathy[M54.16]Lumbarspondylosis[M47.816]Stenosisoflateralrecessoflumbarspine[M48.061]Lumbardischerniatio withradiculopathy[M51.16]      Procedure Performed:   Minimally invasive left L4-5, L5-S1 laminoforaminotomies and partial medial facetectomies and microdiscectomies    Surgeons and Role:     * Gregorio Martin MD - Primary    Assistant(s):  PA: David Alcantara PA-C     Surgical Findings: As expected preop. Counting from the bottom up L4-5, L5-S1 levels operated on. Please note sacralized L5.      Specimen: L4-5 and L5-S1 disc fragments sent for pathologic examination.      Estimated Blood Loss: 50 mL    Gregorio Martin MD  [place] [address]  [phone]  Pager [phone]  2/19/2025 10:52 AM      This note was created using a voice-recognition transcribing system. Incorrect words or phrases may have been missed during proofreading. Please interpret accordingly.

## 2025-02-19 NOTE — ANESTHESIA POSTPROCEDURE EVALUATION
Patient: Cristina Gonzalez    Procedure Summary       Date: 02/19/25 Room / Location: [facility] MAIN OR  / [facility] MAIN OR    Anesthesia Start: 0730 Anesthesia Stop:     Procedure: Minimally invasive left lumbar 4 to 5, lumbar 5 to sacral 1 laminoforaminotomies and partial medial facetectomies, lumbar 4 to 5 microdiscectomy (Left: Spine Lumbar) Diagnosis:       Degeneration of intervertebral disc of lumbar region with discogenic back pain and lower extremity pain      Lumbar radiculopathy      Lumbar spondylosis      Stenosis of lateral recess of lumbar spine      Lumbar disc herniation with radiculopathy      (Degeneration of intervertebral disc of lumbar region with discogenic back pain and lower extremity pain[M51.362]Lumbarradiculopathy[M54.16]Lumbarspondylosis[M47.816]Stenosisoflateralrecessoflumbarspine[M48.061]Lumbardischerniatio withradiculopathy[M51.16])    Surgeons: Gregorio Martin MD Anesthesiologist: Molly Gill MD    Anesthesia Type: general ASA Status: 3            Anesthesia Type: general    Vitals Value Taken Time   /63 02/19/25 1106   Temp 97.6 °F (36.4 °C) 02/19/25 1106   Pulse 101 02/19/25 1106   Resp 14 02/19/25 1106   SpO2 100 % 02/19/25 1106   Vitals shown include unfiled device data.    [facility] AN Post Evaluation:   Patient Evaluated in PACU  Patient Participation: complete - patient participated  Level of Consciousness: awake  Pain Score: 2  Airway Patency:patent  Yes    Nausea/Vomiting: none  Cardiovascular Status: acceptable  Respiratory Status: acceptable  Postoperative Hydration acceptable      Molly Gill MD  2/19/2025 11:07 AM

## 2025-02-19 NOTE — ANESTHESIA PREPROCEDURE EVALUATION
Anesthesia PreOp Note    HPI:     Cristina Gonzalez is a 49 year old female who presents for preoperative consultation requested by: Gregorio Martin MD    Date of Surgery: 2/19/2025    Procedure(s):  Minimally invasive left lumbar 4 to 5, lumbar 5 to sacral 1 laminoforaminotomies and partial medial facetectomies, lumbar 4 to 5 microdiscectomy  Indication: Degeneration of intervertebral disc of lumbar region with discogenic back pain and lower extremity pain [M51.362]  Lumbar radiculopathy [M54.16]  Lumbar spondylosis [M47.816]  Stenosis of lateral recess of lumbar spine [M48.061]  Lumbar disc herniation with radiculopathy [M51.16]    Relevant Problems   No relevant active problems       NPO:  Last Liquid Consumption Date: 02/19/25  Last Liquid Consumption Time: 0230 (pre surgery gatorade)  Last Solid Consumption Date: 02/18/25  Last Solid Consumption Time: 1900  Last Liquid Consumption Date: 02/19/25          History Review:  Patient Active Problem List    Diagnosis Date Noted    Lumbar radiculopathy 12/26/2024    Right lateral epicondylitis 11/05/2020    Allergic rhinitis - [place] 02/19/2013    Deviated nasal septum 02/19/2013    Vocal cord dysfunction 02/19/2013    LPRD (laryngopharyngeal reflux disease) 02/19/2013    Lumbar disc herniation 02/21/2008    Thoracic or lumbosacral neuritis or radiculitis, unspecified 01/18/2008    Other forms of migraine, without mention of intractable migraine without mention of status migrainosus 01/18/2008    Generalized anxiety disorder 01/18/2008       Past Medical History:    Allergic rhinitis    Anesthesia complication    Anxiety state, unspecified    Back problem    Backache, unspecified    Carotid stenosis    Diverticulosis of large intestine    Esophageal reflux    Fibroid    IBS    IBS (irritable bowel syndrome)    Migraines    RYLAN (obstructive sleep apnea)    AHI-6    Other forms of migraine    PONV (postoperative nausea and vomiting)    Pseudoaneurysm of carotid  artery    left ICA, had small dissection    Vitamin D insufficiency       Past Surgical History:   Procedure Laterality Date    Colonoscopy  2/17/17= Diverticulosis    Colonoscopy N/A 02/17/2017    Procedure: COLONOSCOPY, POSSIBLE BIOPSY, POSSIBLE POLYPECTOMY 94361;  Surgeon: Jt Romero MD;  [facility] [facility]    Colonoscopy  08/23/19:  Diverticulosis, Hemorrhoids    done at [facility]. Repeat 2029.  Needs MAC    Other surgical history Bilateral     ovarian tumor    Special service or report       benign tumor of uterus - fibrothecoma     Special service or report       B. knee arthroscopy    Upper gi endoscopy - referral  03/2013    mild esophagitis.  SB Bx negative    Upper gi endoscopy performed  08/23/19:  Hiatal hernia    Upper gi endoscopy,biopsy  03/07/2013    Procedure: ESOPHAGOGASTRODUODENOSCOPY, POSSIBLE BIOPSY, POSSIBLE POLYPECTOMY 94641;  Surgeon: Jt Romero MD;  Location: [facility]       Prescriptions Prior to Admission[1]  Current Medications and Prescriptions Ordered in Epic[2]    Allergies[3]    Family History   Problem Relation Age of Onset    Psychiatric Mother         anxiety    Anxiety Mother     Psychiatric Sister         anxiety    Other (Other) Sister         essential tremor    Other (Other) Father         essential tremor    Dementia Father     Tremor Father     Cancer Maternal Grandfather         prostate    Diabetes Paternal Grandmother     Dementia Paternal Grandmother     Heart Disorder Paternal Grandfather     Heart Attack Paternal Grandfather     Other (Other) Daughter         epilepsy    Other (Other) Maternal Grandmother         CVA    Stroke Maternal Grandmother      Social History     Socioeconomic History    Marital status:     Number of children: 4   Occupational History    Occupation: teacher, 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   Tobacco Use    Smoking status: Former     Current packs/day: 0.00     Average packs/day: 0.2 packs/day for 2.0 years  (0.4 ttl pk-yrs)     Types: Cigarettes     Start date: 1993     Quit date: 1995     Years since quittin.6    Smokeless tobacco: Never    Tobacco comments:     was a social smoker till    Vaping Use    Vaping status: Never Used   Substance and Sexual Activity    Alcohol use: Yes     Alcohol/week: 0.0 - 1.0 standard drinks of alcohol     Comment: little     Drug use: No    Sexual activity: Yes     Partners: Male   Other Topics Concern     Service No    Blood Transfusions No    Caffeine Concern No    Occupational Exposure No    Hobby Hazards No    Sleep Concern Yes     Comment: sometimes    Stress Concern Yes     Comment: changed jobs- 4 children     Weight Concern Yes    Special Diet Yes     Comment: counting calories    Back Care Yes    Exercise Yes    Seat Belt Yes    Self-Exams Yes       Available pre-op labs reviewed.  Lab Results   Component Value Date    URINEPREG Negative 2025             Vital Signs:  Body mass index is 22.93 kg/m².   height is 1.74 m (5' 8.5\") and weight is 69.4 kg (153 lb). Her oral temperature is 98 °F (36.7 °C). Her blood pressure is 99/68 and her pulse is 91. Her respiration is 16 and oxygen saturation is 97%.   Vitals:    25 1000 25 0624   BP:  99/68   Pulse:  91   Resp:  16   Temp:  98 °F (36.7 °C)   TempSrc:  Oral   SpO2:  97%   Weight: 67.1 kg (148 lb) 69.4 kg (153 lb)   Height: 1.74 m (5' 8.5\") 1.74 m (5' 8.5\")        Anesthesia Evaluation     Patient summary reviewed and Nursing notes reviewed    History of anesthetic complications   Airway   Mallampati: II  TM distance: >3 FB  Neck ROM: full  Dental - Dentition appears grossly intact     Pulmonary - normal exam   (+) sleep apnea  Cardiovascular - negative ROS and normal exam    ECG reviewed    Neuro/Psych    (+)  neuromuscular disease, anxiety/panic attacks,        GI/Hepatic/Renal    (+) GERD    Endo/Other - negative ROS   Abdominal  - normal exam                 Anesthesia Plan:   ASA:   3  Plan:   General  Airway:  ETT  Post-op Pain Management: IV analgesics  Informed Consent Plan and Risks Discussed With:  Patient  Use of Blood Products Discussed With:  Patient  Discussed plan with:  Attending      I have informed Cristina Hodgeine Lisa and/or legal guardian or family member of the nature of the anesthetic plan, benefits, risks including possible dental damage if relevant, major complications, and any alternative forms of anesthetic management.   All of the patient's questions were answered to the best of my ability. The patient desires the anesthetic management as planned.  Molly Gill MD  2/19/2025 7:24 AM  Present on Admission:  **None**           [1]   Medications Prior to Admission   Medication Sig Dispense Refill Last Dose/Taking    pregabalin 150 MG Oral Cap Take 1 capsule (150 mg total) by mouth 2 (two) times daily. 60 capsule 1 2/19/2025 at  5:00 AM    oxyCODONE 5 MG Oral Tab Take 1-2 tablets (5-10 mg total) by mouth every 4 (four) hours as needed for Pain. 30 tablet 0 2/18/2025 at  9:00 PM    ALPRAZolam 0.5 MG Oral Tab Take 1 tablet (0.5 mg total) by mouth 2 (two) times daily.   Past Month    ZAVZPRET 10 MG/ACT Nasal Solution 10 mg by Nasal route.   Past Month    WEGOVY 2.4 MG/0.75ML Subcutaneous Solution Auto-injector INJECT 2.4 MG SUBCUTANEOUSLY ONE TIME PER WEEK FOR 84 DAYS   2/10/2025    Estradiol Starter Pack (IMVEXXY STARTER PACK) 4 MCG Vaginal Insert PLACE 4 MCG VAGINALLY DAILY.   Taking    traMADol 50 MG Oral Tab Take 1 tablet (50 mg total) by mouth every 6 (six) hours as needed for Pain. 60 tablet 0 2/19/2025 at  5:00 AM    AJOVY 225 MG/1.5ML Subcutaneous Solution Auto-injector Inject 225 mg into the skin every 30 (thirty) days.   1/23/2025    Meloxicam 15 MG Oral Tab Take 1 tablet (15 mg total) by mouth daily. 30 tablet 0 Past Month    venlafaxine 150 MG Oral Capsule SR 24 Hr 1 capsule (150 mg total) every morning.   2/19/2025 at  5:00 AM    PANTOPRAZOLE 40 MG Oral Tab EC  TAKE 1 TABLET (40 MG TOTAL) BY MOUTH 2 (TWO) TIMES DAILY BEFORE MEALS. (Patient taking differently: Take 1 tablet (40 mg total) by mouth every morning before breakfast.) 180 tablet 3 2/19/2025 at  5:00 AM    Riboflavin 400 MG Oral Tab Take by mouth.   Past Month    Coenzyme Q10 (CO Q 10 OR) Take 300 mg by mouth.   Past Month    diazepam (VALIUM) 10 MG Oral Tab Take 1 tablet (10 mg total) by mouth every 12 (twelve) hours as needed (back spasms). 30 tablet 1 Past Month    ergocalciferol 1.25 MG (45135 UT) Oral Cap Take 1 capsule (50,000 Units total) by mouth every 14 (fourteen) days. 6 capsule 3 Past Month    Aspirin 81 MG Oral Cap Take 81 mg by mouth daily.   2/19/2025 at  5:00 AM    fexofenadine 180 MG Oral Tab Take 1 tablet (180 mg total) by mouth every morning.   2/17/2025    MULTI-VITAMIN OR TABS 1 TABLET DAILY   Past Month    ondansetron 4 MG Oral Tablet Dispersible Take 1 tablet (4 mg total) by mouth every 8 (eight) hours as needed for Nausea. 20 tablet 0 More than a month    cyclobenzaprine 10 MG Oral Tab Take 1 tablet (10 mg total) by mouth 3 (three) times daily as needed for Muscle spasms. 30 tablet 3 More than a month    Hyoscyamine Sulfate (LEVSIN) 0.125 MG Oral Tab Take 1 tablet (125 mcg total) by mouth every 6 (six) hours as needed for Cramping. 60 tablet 3 More than a month    Meclizine HCl 25 MG Oral Tab Take 1 tablet (25 mg total) by mouth 3 (three) times daily as needed. 20 tablet 2 More than a month   [2]   Current Facility-Administered Medications Ordered in Epic   Medication Dose Route Frequency Provider Last Rate Last Admin    lactated ringers infusion   Intravenous Continuous Gregorio Martin MD 20 mL/hr at 02/19/25 0655 New Bag at 02/19/25 0655    acetaminophen (Tylenol Extra Strength) tab 1,000 mg  1,000 mg Oral Once Gregorio Martin MD        ceFAZolin (Ancef) 2g in 10mL IV syringe premix  2 g Intravenous Once Gregorio Martin MD         No current [facility]-ordered outpatient medications on  file.   [3]   Allergies  Allergen Reactions    Sulfa Antibiotics HIVES     Hives to arms

## 2025-02-19 NOTE — INTERVAL H&P NOTE
Pre-op Diagnosis: Degeneration of intervertebral disc of lumbar region with discogenic back pain and lower extremity pain [M51.362]  Lumbar radiculopathy [M54.16]  Lumbar spondylosis [M47.816]  Stenosis of lateral recess of lumbar spine [M48.061]  Lumbar disc herniation with radiculopathy [M51.16]    The above referenced H&P was reviewed by Gregorio Martin MD on 2/19/2025, the patient was examined and no significant changes have occurred in the patient's condition since the H&P was performed. I discussed the proposed operation with Ms.?Amy Mercy Gonzalez in detail, including the risks, benefits, and alternatives to surgery, using language she could understand. The risks discussed included, but were not limited to, neurologic injury, such as spinal cord or nerve root injury, potentially resulting in temporary or permanent deficits, including weakness, numbness, or paralysis; infection, ranging from superficial wound infection to deep infections, such as vertebral osteomyelitis or epidural abscess; intraoperative bleeding from blood vessel injury, which could lead to stroke, hematoma, or death from exsanguination; postoperative hemorrhage, which could result in neurologic compromise; dural tear, possibly leading to pseudomeningocele or cerebrospinal fluid (CSF) fistula despite intraoperative repair; additional spinal degeneration or require further intervention; substantial blood loss, which could necessitate transfusion; paralysis or loss of bladder and bowel control; injury to adjacent structures, including major blood vessels, visceral organs, or the pleura, potentially leading to pneumothorax; and complications related to anesthesia, including acute myocardial infarction, venous thromboembolism, pneumonia, urinary tract infection, or death.    Non-surgical alternatives and the potential risks and benefits of foregoing surgical intervention were also reviewed. Ms.?Amy Mercy Gonzalez demonstrated understanding  of the above information, asked appropriate questions, and confirmed that all questions were answered to satisfaction. She has voluntarily elected to proceed with surgery, which is scheduled for today, 2/19/2025. Written informed consent has been obtained and documented.    Gregorio Martin MD  Neurological Surgery    [address], [address] [place] [address]  [phone]  Pager [phone]  2/19/2025 6:53 AM      This note was created using a voice-recognition transcribing system. Incorrect words or phrases may have been missed during proofreading. Please interpret accordingly.

## 2025-02-19 NOTE — ANESTHESIA PROCEDURE NOTES
Airway  Date/Time: 2/19/2025 7:39 AM  Urgency: Elective      General Information and Staff    Patient location during procedure: OR  Anesthesiologist: Malika oMreno MD  Performed: anesthesiologist   Performed by: Molly Gill MD  Authorized by: Molly Gill MD      Indications and Patient Condition  Indications for airway management: anesthesia  Spontaneous Ventilation: absent  Sedation level: deep  Preoxygenated: yes  Patient position: sniffing  Mask difficulty assessment: 1 - vent by mask    Final Airway Details  Final airway type: endotracheal airway      Successful airway: ETT  Cuffed: yes   Successful intubation technique: Video laryngoscopy  Facilitating devices/methods: intubating stylet  Endotracheal tube insertion site: oral  Blade: GlideScope  Blade size: #3  ETT size (mm): 7.0    Cormack-Lehane Classification: grade I - full view of glottis  Placement verified by: capnometry   Measured from: teeth  ETT to teeth (cm): 21  Number of attempts at approach: 1  Number of other approaches attempted: 0    Additional Comments  Dentition unchanged

## 2025-02-19 NOTE — OPERATIVE REPORT
[facility]  part of [facility]  Neurosurgery Operative Note         Cristina Gonzalez Location: OR   [facility] [id number] MRN [id number]   Admission Date 2/19/2025 Operation Date 2/19/2025   Attending Physician Gregorio Martin MD       Patient Name: Cristina Gonzalez     Neurosurgery Operative Note    Date of surgery:  2/19/2025    Surgeon:  Gregorio Martin MD     Assistant:  David Alcantara PA-C     A skilled surgical assistant was medically necessary due to the complexity of multi-level decompression, need for precise retraction, and preservation of neural structures.    Preoperative diagnosis:  Lumbalgia  Lumbar spondylosis  Lumbar disc herniation with radiculopathy (L4-5 with left L5 radiculopathy)  Lumbar lateral recess stenosis (L5-S1 with left S1 radiculopathy)  Lumbarized S1 vertebral body     Postoperative diagnosis:  Lumbalgia  Lumbar spondylosis  Lumbar disc herniation with radiculopathy (L4-5 with left L5 radiculopathy)  Lumbar lateral recess stenosis (L5-S1 with left S1 radiculopathy)  Lumbarized S1 vertebral body  Status post L4-5, and L5-S1 decompressions     Procedure performed:  Minimally invasive left L4-L5 laminotomy, partial medial facetectomy and discectomy.  Minimally invasive left L5-S1 laminotomy, partial medial facetectomy and discectomy.  Use of intraoperative fluoroscopy including interpretation of x-rays  Use of operating microscope     Indications for procedure:  Ms. Gonzalez is a 49-year-old female who was referred to my clinic with complaints of low back and left leg pain that had been unrelenting, refractory to nonoperative treatment. She was neurologically intact on exam and her imaging studies demonstrated a left paracentral disc protrusion at L4-L5 that impinged on the left L5 nerve root and left lateral recess at L5-S1 leading to compression of the left S1 nerve root. Please note the presence of a sacralized L5 or lumbarized S1. For the purposes of surgical  counting we are taking the first real intervertebral disc as L5-S1 (which is labeled L4-L5 on official MRI report). Given the circumstances, I felt surgical treatment of her pathology was indicated, could prove beneficial, and thus recommended it to her. Prior to surgery and during preoperative counseling, the risks, benefits, and potential outcomes of the surgical intervention were outlined to Ms. Gonzalez using language she could comprehend. She expressed her understanding and elected to proceed.      Procedure in detail:  The patient was identified and taken to the operating room. She was placed under general endotracheal anesthesia without complication. She was positioned prone on a Juan frame, which laid on top of a flat Robe table. Her shoulders were abducted and arms flexed to 90 degrees. All pressure points were appropriately padded. She was firmly secured to the operating table. She received the appropriate preoperative antibiotic prophylaxis. Sequential compression devices were maintained on the lower extremities for DVT prophylaxis. The lumbar region was identified. It was prepped and draped in sterile fashion. An appropriate time out was performed following standard protocol. Under fluoroscopic guidance, a spinal needle was used to localize the L5 vertebral body and accordingly plan the surgical incision.    A 2 cm incision was made 1.5 cm to the left of midline overlying the L5 pedicle.  Using a guidewire, a series of sequential dilators and tubular retractor, access was gained to the inferior aspect of the left L5 lamina, the medial aspect of the left L5-S1 facet joint and left L5-S1 interlaminar space. Intraoperative fluoroscopy was used to confirm adequate positioning of the tubular apparatus after which the operating microscope was brought in for better visualization. Under microscopic magnification, soft tissue dissection was performed exposing the aforementioned landmarks. A high-speed drill  was then used to perform a left inferior laminotomy of L5.  The bony resection was taken superiorly until the ligamentum flavum could be detached from the undersurface of the left L5 lamina. The ligamentum flavum was elevated and resected, exposing the dura beneath. The left L5-S1 lateral recess decompression was then performed using a series of rongeurs and curettes. Safe exposure to the nerve root necessitated a partial medial facetectomy. This was done until the lateral border of the dura and traversing left S1 nerve root was identified and freed dorsally along its course within the subarticular compartment to the level of the left S1 pedicle. We retracted the lateral border of the dura and the left S1 nerve root medially exposing the L5-S1 intervertebral disc.  We coagulated the overlying epidural vasculature, and palpated for any disc fragments. Some fragments were retrieved and sent for pathologic examination. The posterior aspect of the left L5-S1 disc was flush with the posterior aspect of the L5 and S1 vertebral bodies. Afterwards, we achieved satisfactory hemostasis and again tracked the path of the left S1 nerve root within the subarticular compartment to the level of the left L5 pedicle, found it to be satisfactorily decompressed along its ventral and dorsal aspects. We again achieved adequate hemostasis, irrigated the surgical field and placed 40 mg of Depo-Medrol into the epidural space overlying the left L5-S1 lateral recess and the left S1 nerve root.  We then pulled out the tubular retractor, ensuring to coagulate any actively bleeding muscle tissue on the way out.     We then directed our attention towards the L4-L5 interspace and then performed a similar procedure as was done at the L5-S1 interspace. A large herniated disc fragment was resected at this level. We again achieved adequate hemostasis, irrigated the surgical field and placed another dose of 40 mg of Depo-Medrol into the epidural space.  We then pulled out the tubular retractor, ensuring to coagulate any actively bleeding muscle tissue on the way out.     The surgical incision was then closed in multiple layers and reapproximated at the skin with 4-0 Monocryl in a subcuticular fashion. The patient was then allowed to emerge from general anesthesia and was subsequently extubated. She was taken to the recovery room for further convalescence.    Anesthesia: General endotracheal    Estimated blood loss: 50 mL    Counts: All needle, sponge, and cottonoid counts were reported correct at the end of the operation.     Complications: None     Drains: None     Implants: None     Specimen:   ID Type Source Tests Collected by Time Destination   1 : 1. L5-S1 Disc Tissue Tissue SURGICAL PATHOLOGY TISSUE Gregorio Martin MD 2/19/2025  9:38 AM    2 : 2. L4-5 Disc Tissue Tissue SURGICAL PATHOLOGY TISSUE Gregorio Martin MD 2/19/2025 10:19 AM       Wound classification: 1, clean    Disposition: Home to self care     Condition: Stable, neurologically at baseline    Gregorio Martin MD  Neurological Surgery    [facility] [address], [address] [place] [address]  [phone]  Pager [phone]  2/19/2025 10:54 AM      This note was created using a voice-recognition transcribing system. Incorrect words or phrases may have been missed during proofreading. Please interpret accordingly.

## 2025-02-24 PROBLEM — M51.16 LUMBAR DISC HERNIATION WITH RADICULOPATHY: Status: ACTIVE | Noted: 2024-12-26

## 2025-02-25 ENCOUNTER — PATIENT MESSAGE (OUTPATIENT)
Dept: SURGERY | Facility: CLINIC | Age: 50
End: 2025-02-25

## 2025-02-26 NOTE — TELEPHONE ENCOUNTER
Patient is s/p Minimally invasive left lumbar 4 to 5, lumbar 5 to sacral 1 laminoforaminotomies and partial medial facetectomies, lumbar 4 to 5 microdiscectomy performed on 2-19-25 by Dr Martin.      Routed to provider to advise on medications.

## 2025-02-27 NOTE — TELEPHONE ENCOUNTER
Message received from patient.    Patient acknowledged and appreciative of message. Patient will see Provider 3/12/25 for follow-up.

## 2025-03-12 ENCOUNTER — OFFICE VISIT (OUTPATIENT)
Dept: SURGERY | Facility: CLINIC | Age: 50
End: 2025-03-12
Payer: COMMERCIAL

## 2025-03-12 VITALS
HEIGHT: 68.5 IN | SYSTOLIC BLOOD PRESSURE: 95 MMHG | WEIGHT: 153 LBS | DIASTOLIC BLOOD PRESSURE: 36 MMHG | HEART RATE: 81 BPM | BODY MASS INDEX: 22.92 KG/M2

## 2025-03-12 DIAGNOSIS — Z98.890 S/P LUMBAR MICRODISCECTOMY: ICD-10-CM

## 2025-03-12 DIAGNOSIS — Z98.890 POSTOPERATIVE STATE: Primary | ICD-10-CM

## 2025-03-12 DIAGNOSIS — Z48.89 ENCOUNTER FOR POSTOPERATIVE WOUND CHECK: ICD-10-CM

## 2025-03-12 DIAGNOSIS — M54.16 LUMBAR RADICULITIS: ICD-10-CM

## 2025-03-12 PROCEDURE — 99024 POSTOP FOLLOW-UP VISIT: CPT | Performed by: STUDENT IN AN ORGANIZED HEALTH CARE EDUCATION/TRAINING PROGRAM

## 2025-03-12 NOTE — PROGRESS NOTES
Established Neurosurgery Patient    Patient: Cristina Gonzalez  Medical Record Number: [id number]  YOB: 1975  PCP: Faby Coombs MD    Reason for visit: 3-week postoperative visit    HISTORY OF PRESENTING ILLNESS:  Cristina Gonzalez is a pleasant 49 year old female who returns to the neurosurgery clinic today approximately 3 weeks s/p L4-S1 laminoforaminotomies, partial medial facetectomy, and microdiscectomy with Dr. Martin on 2/19/2025.  The patient is recovering very well from a surgical standpoint.  She notes significant improvement in her preoperative radicular pain.  She notes some discomfort in her back that radiates laterally.  She has continued to take her gabapentin and is utilizing Tylenol.  She has discontinued Robaxin and has not needed narcotics.  She notes a sensation in her left lateral leg, which she describes as \"a sunburn\" feeling.  She also notes that similar sensation in her right groin.  Otherwise, no new neurologic complaints.  She is getting around well.  She has been able to walk a mile on her own without any issues.  She was able to travel to [place] a couple of weeks after surgery.  Denies any issues with her incision site.  She is overall pleased with her progress up to this point.  She had been working with Nuha Crandall of physical therapy and is interested in returning to her in the next few weeks for postoperative rehabilitation and core strengthening.     Past Medical History:    Allergic rhinitis    Anesthesia complication    Anxiety state, unspecified    Back problem    Backache, unspecified    Carotid stenosis    Diverticulosis of large intestine    Esophageal reflux    Fibroid    IBS    IBS (irritable bowel syndrome)    Migraines    RYLAN (obstructive sleep apnea)    AHI-6    Other forms of migraine    PONV (postoperative nausea and vomiting)    Pseudoaneurysm of carotid artery    left ICA, had small dissection    Vitamin D insufficiency      Past Surgical  History:   Procedure Laterality Date    Colonoscopy  17= Diverticulosis    Colonoscopy N/A 2017    Procedure: COLONOSCOPY, POSSIBLE BIOPSY, POSSIBLE POLYPECTOMY 70684;  Surgeon: Jt Romero MD;  Location: [facility]    Colonoscopy  19:  Diverticulosis, Hemorrhoids    done at [facility]. Repeat .  Needs MAC    Other surgical history Bilateral     ovarian tumor    Special service or report       benign tumor of uterus - fibrothecoma     Special service or report       B. knee arthroscopy    Upper gi endoscopy - referral  2013    mild esophagitis.  SB Bx negative    Upper gi endoscopy performed  19:  Hiatal hernia    Upper gi endoscopy,biopsy  2013    Procedure: ESOPHAGOGASTRODUODENOSCOPY, POSSIBLE BIOPSY, POSSIBLE POLYPECTOMY 30501;  Surgeon: Jt Romero MD;  Location: [facility]      Family History   Problem Relation Age of Onset    Psychiatric Mother         anxiety    Anxiety Mother     Psychiatric Sister         anxiety    Other (Other) Sister         essential tremor    Other (Other) Father         essential tremor    Dementia Father     Tremor Father     Cancer Maternal Grandfather         prostate    Diabetes Paternal Grandmother     Dementia Paternal Grandmother     Heart Disorder Paternal Grandfather     Heart Attack Paternal Grandfather     Other (Other) Daughter         epilepsy    Other (Other) Maternal Grandmother         CVA    Stroke Maternal Grandmother       Social History     Socioeconomic History    Marital status:     Number of children: 4   Occupational History    Occupation: teacher, 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   Tobacco Use    Smoking status: Former     Current packs/day: 0.00     Average packs/day: 0.2 packs/day for 2.0 years (0.4 ttl pk-yrs)     Types: Cigarettes     Start date: 1993     Quit date: 1995     Years since quittin.6    Smokeless tobacco: Never    Tobacco comments:     was a social smoker till  1995   Vaping Use    Vaping status: Never Used   Substance and Sexual Activity    Alcohol use: Yes     Alcohol/week: 0.0 - 1.0 standard drinks of alcohol     Comment: little     Drug use: No    Sexual activity: Yes     Partners: Male   Other Topics Concern     Service No    Blood Transfusions No    Caffeine Concern No    Occupational Exposure No    Hobby Hazards No    Sleep Concern Yes     Comment: sometimes    Stress Concern Yes     Comment: changed jobs- 4 children     Weight Concern Yes    Special Diet Yes     Comment: counting calories    Back Care Yes    Exercise Yes    Seat Belt Yes    Self-Exams Yes      Allergies[1]   Current Medications:  Current Outpatient Medications   Medication Sig Dispense Refill    acetaminophen 500 MG Oral Tab Take 1 tablet (500 mg total) by mouth every 4 (four) hours as needed. 120 tablet 0    methocarbamol 500 MG Oral Tab Take 1 tablet (500 mg total) by mouth 3 (three) times daily as needed. 60 tablet 0    oxyCODONE 5 MG Oral Tab Take 1 tablet (5 mg total) by mouth every 4 (four) hours as needed. 30 tablet 0    pregabalin 150 MG Oral Cap Take 1 capsule (150 mg total) by mouth 2 (two) times daily. 60 capsule 1    ALPRAZolam 0.5 MG Oral Tab Take 1 tablet (0.5 mg total) by mouth 2 (two) times daily.      ZAVZPRET 10 MG/ACT Nasal Solution 10 mg by Nasal route.      WEGOVY 2.4 MG/0.75ML Subcutaneous Solution Auto-injector INJECT 2.4 MG SUBCUTANEOUSLY ONE TIME PER WEEK FOR 84 DAYS      Estradiol Starter Pack (IMVEXXY STARTER PACK) 4 MCG Vaginal Insert PLACE 4 MCG VAGINALLY DAILY.      AJOVY 225 MG/1.5ML Subcutaneous Solution Auto-injector Inject 225 mg into the skin every 30 (thirty) days.      Meloxicam 15 MG Oral Tab Take 1 tablet (15 mg total) by mouth daily. 30 tablet 0    venlafaxine 150 MG Oral Capsule SR 24 Hr 1 capsule (150 mg total) every morning.      PANTOPRAZOLE 40 MG Oral Tab EC TAKE 1 TABLET (40 MG TOTAL) BY MOUTH 2 (TWO) TIMES DAILY BEFORE MEALS. (Patient taking  differently: Take 1 tablet (40 mg total) by mouth every morning before breakfast.) 180 tablet 3    ondansetron 4 MG Oral Tablet Dispersible Take 1 tablet (4 mg total) by mouth every 8 (eight) hours as needed for Nausea. 20 tablet 0    Riboflavin 400 MG Oral Tab Take by mouth.      Coenzyme Q10 (CO Q 10 OR) Take 300 mg by mouth.      ergocalciferol 1.25 MG (76303 UT) Oral Cap Take 1 capsule (50,000 Units total) by mouth every 14 (fourteen) days. 6 capsule 3    Hyoscyamine Sulfate (LEVSIN) 0.125 MG Oral Tab Take 1 tablet (125 mcg total) by mouth every 6 (six) hours as needed for Cramping. 60 tablet 3    Aspirin 81 MG Oral Cap Take 81 mg by mouth daily.      Meclizine HCl 25 MG Oral Tab Take 1 tablet (25 mg total) by mouth 3 (three) times daily as needed. 20 tablet 2    fexofenadine 180 MG Oral Tab Take 1 tablet (180 mg total) by mouth every morning.      MULTI-VITAMIN OR TABS 1 TABLET DAILY          REVIEW OF SYSTEMS:  Comprehensive review of systems completed and negative with the exception of aforementioned information in the HPI.     PHYSICAL EXAM:  BP 95/36 (BP Location: Left arm, Patient Position: Sitting, Cuff Size: adult)   Pulse 81   Ht 68.5\"   Wt 153 lb (69.4 kg)   BMI 22.93 kg/m²   Body mass index is 22.93 kg/m².  Wt Readings from Last 6 Encounters:   03/12/25 153 lb (69.4 kg)   02/19/25 153 lb (69.4 kg)   01/22/25 150 lb (68 kg)   01/16/25 150 lb (68 kg)   01/13/25 145 lb (65.8 kg)   12/23/24 145 lb (65.8 kg)        General: Well developed, well nourished, in no acute distress.     Incision: Healing well.  No erythema, warmth, swelling, gross change appreciated.  Steri-Strips intact.    HEENT: Normocephalic, atraumatic.    Respirations: Non-labored     Neurologic / Musculoskeletal: Awake, alert, and interactive. Recent and remote memory appear intact. Attention span and concentration are appropriate. No dysarthria. Appropriately names objects. Coordination and motor control grossly intact. Patient  follows commands briskly and appropriately.  Lower extremity motor exam stable.    IMAGING:  No new neurosurgical imaging to review at this time    ASSESSMENT / PLAN:    ICD-10-CM   1. Postoperative state  Z98.890      2. Encounter for postoperative wound check  Z48.89      3. S/P lumbar microdiscectomy  Z98.890      4. Lumbar radiculitis  M54.16        Cristina Gonzalez returns to the clinic today approximately 3 weeks s/p L4-S1 laminoforaminotomies, partial medial facetectomy, and microdiscectomy with Dr. Martin on 2/19/2025.  The patient is recovering well from a surgical standpoint.  She describes sensations in her lower extremities that are consistent with lumbar radiculitis and potentially postoperative pain secondary to compensation from her hips/pelvis.  The symptoms are manageable.  I encouraged her to continue on the Lyrica for now.  We can consider weaning off of it at the 6-week postoperative appointment if she is continuing to do well.  She does have 1 refill available for this medication.  She can continue to take Tylenol as needed for her discomfort.  She will be planning to leave for a cruise on 3/21/2025.  It is likely that her incision will be healed enough for her to submerge it in pools or the ocean, as well as be able to apply sunscreen to it.  I encouraged her to send a picture via [employer] of the incision and/or follow-up in the office for a brief incision check a day or 2 before she leaves for final confirmation.  Both her and her , who presents to the office today with the patient, were advised of warning signs/symptoms that would warrant immediate evaluation concerning the wound, which includes, but is not limited to, erythema, warmth, swelling, or gross drainage.  If systemic/constitutional symptoms develop, the incision must be evaluated promptly to rule out that area as a source for infection.    -Medications Prescribed: None  -Imaging Ordered: None  -Referrals Placed: Physical  therapy  -Follow up: 4/3/2025 at 0900    Plan was reviewed and discussed in detail with the patient. Patient encouraged to call the office with any questions or concerns of new/worsening neurologic symptoms. Patient demonstrated good understanding and was agreeable with the plan.     Visit time: 25 minutes   Over 50% of that time was spent providing patient education and discussing care plan.    David Alcantara PA-C  Physician Assistant- Neurosurgery   [facility]  3/12/2025, 9:54 AM               [1]   Allergies  Allergen Reactions    Sulfa Antibiotics HIVES     Hives to arms

## 2025-03-12 NOTE — PROGRESS NOTES
Post op 3 weeks; she is doing well    Pain scale today: 1/10, she is doing great and has no complaints  Dist of pain: some N/T remaining on the outer part on left leg-  feels like a sunburn/ sensitive   Meds for relief:  lyrica, tylenol prn,     Pt expresses readiness for PT after upcoming trips

## 2025-04-01 RX ORDER — DEXTROAMPHETAMINE SACCHARATE, AMPHETAMINE ASPARTATE, DEXTROAMPHETAMINE SULFATE AND AMPHETAMINE SULFATE 1.25; 1.25; 1.25; 1.25 MG/1; MG/1; MG/1; MG/1
1 TABLET ORAL 2 TIMES DAILY
COMMUNITY
Start: 2025-02-27

## 2025-04-01 NOTE — PATIENT INSTRUCTIONS
Refill policies:    Allow 2-3 business days for refills; controlled substances may take longer.  Contact your pharmacy at least 5 days prior to running out of medication and have them send an electronic request or submit request through the “request refill” option in your [employer] account.  Refills are not addressed on weekends; covering physicians do not authorize routine medications on weekends.  No narcotics or controlled substances are refilled after noon on Fridays or by on call physicians.  By law, narcotics must be electronically prescribed.  A 30 day supply with no refills is the maximum allowed.  If your prescription is due for a refill, you may be due for a follow up appointment.  To best provide you care, patients receiving routine medications need to be seen at least once a year.  Patients receiving narcotic/controlled substance medications need to be seen at least once every 3 months.  In the event that your preferred pharmacy does not have the requested medication in stock (e.g. Backordered), it is your responsibility to find another pharmacy that has the requested medication available.  We will gladly send a new prescription to that pharmacy at your request.    Scheduling Tests:    If your physician has ordered radiology tests such as MRI or CT scans, please contact Central Scheduling at [phone] right away to schedule the test.  Once scheduled, the [facility] Centralized Referral Team will work with your insurance carrier to obtain pre-certification or prior authorization.  Depending on your insurance carrier, approval may take 3-10 days.  It is highly recommended patients assure they have received an authorization before having a test performed.  If test is done without insurance authorization, patient may be responsible for the entire amount billed.      Precertification and Prior Authorizations:  If your physician has recommended that you have a procedure or additional testing performed the [facility]  Centralized Referral Team will contact your insurance carrier to obtain pre-certification or prior authorization.    You are strongly encouraged to contact your insurance carrier to verify that your procedure/test has been approved and is a COVERED benefit.  Although the [facility] Centralized Referral Team does its due diligence, the insurance carrier gives the disclaimer that \"Although the procedure is authorized, this does not guarantee payment.\"    Ultimately the patient is responsible for payment.   Thank you for your understanding in this matter.  Paperwork Completion:  If you require FMLA or disability paperwork for your recovery, please make sure to either drop it off or have it faxed to our office at [phone]. Be sure the form has your name and date of birth on it.  The form will be faxed to our Forms Department and they will complete it for you.  There is a 25$ fee for all forms that need to be filled out.  Please be aware there is a 10-14 day turnaround time.  You will need to sign a release of information (ELIZABETH) form if your paperwork does not come with one.  You may call the Forms Department with any questions at [phone].  Their fax number is [phone].

## 2025-04-03 ENCOUNTER — OFFICE VISIT (OUTPATIENT)
Dept: SURGERY | Facility: CLINIC | Age: 50
End: 2025-04-03
Payer: COMMERCIAL

## 2025-04-03 VITALS — HEART RATE: 101 BPM | SYSTOLIC BLOOD PRESSURE: 94 MMHG | DIASTOLIC BLOOD PRESSURE: 65 MMHG

## 2025-04-03 DIAGNOSIS — Z98.890 POSTOPERATIVE STATE: ICD-10-CM

## 2025-04-03 DIAGNOSIS — M51.362 DEGENERATION OF INTERVERTEBRAL DISC OF LUMBAR REGION WITH DISCOGENIC BACK PAIN AND LOWER EXTREMITY PAIN: ICD-10-CM

## 2025-04-03 DIAGNOSIS — M54.16 LUMBAR RADICULOPATHY: ICD-10-CM

## 2025-04-03 DIAGNOSIS — M51.16 LUMBAR DISC HERNIATION WITH RADICULOPATHY: ICD-10-CM

## 2025-04-03 DIAGNOSIS — Z98.890 S/P LUMBAR MICRODISCECTOMY: Primary | ICD-10-CM

## 2025-04-03 DIAGNOSIS — M47.816 LUMBAR SPONDYLOSIS: ICD-10-CM

## 2025-04-03 DIAGNOSIS — M48.061 STENOSIS OF LATERAL RECESS OF LUMBAR SPINE: ICD-10-CM

## 2025-04-03 PROCEDURE — 99024 POSTOP FOLLOW-UP VISIT: CPT | Performed by: STUDENT IN AN ORGANIZED HEALTH CARE EDUCATION/TRAINING PROGRAM

## 2025-04-03 NOTE — PROGRESS NOTES
The following individual(s) verbally consented to be recorded using ambient AI listening technology and understand that they can each withdraw their consent to this listening technology at any point by asking the clinician to turn off or pause the recording:    Patient name: Cristina Gonzalez  Additional names:  Fred Gonzalez     Patient presents s/p 6 weeks minimally invasive left L4-5. L5-S1 laminoforaminotomies and partial medial facetectomies, L4-5 microdiscectomy. LOV with [facility] was 3/12/25    Leg symptoms: these symptoms have improved. She is having some symptoms that come and go but nothing severe; this feeling is more discomfort   PT: Not started yet; place order today   Meds: Lyrica BID, tylenol prn, ibuprofen prn

## 2025-04-03 NOTE — PROGRESS NOTES
[facility]  Neurological Surgery Post-Operative Patient Clinic Note    Cristina Gonzalez  9/13/1975  [id number]  PCP: Faby Coombs MD  Referring Provider: MATILDE Hardin    REASON FOR VISIT:  Lumbar radiculopathy    NEUROSURGICAL PROCEDURES TO DATE:  2/19/2025 - MIS left L4-5, L5-S1 laminoforaminotomies and partial medial facetectomies and microdiscectomies     HISTORY OF PRESENT ILLNESS 1/13/2025:  Cristina Gonzalez is a(n) 49 year old new patient presenting with approximately two months of severe left-sided low back pain, radiating into the hip, groin, and occasionally into the calf. She traces the onset to November 2024, around the time she increased her exercise routine and took on more lifting responsibilities at home. Despite physical therapy, NSAIDs, muscle relaxants, and one lumbar epidural steroid injection on December 26, 2024, she reports only minimal relief. She denies new bowel or bladder dysfunction but describes difficulty sitting for extended periods and occasional numbness in the left leg after prolonged sitting. Her prior history includes episodic back pain dating to the early 2000s, managed conservatively until this more acute exacerbation. She has not had spinal surgery. Another epidural injection is planned this week, but she is considering surgical evaluation if conservative measures fail.    INTERVAL HISTORY 1/22/2025:  Cristina Gonzalez returns following her recent lumbar transforaminal epidural injection, which afforded only transient, minimal relief of her persistent low back and left leg pain. She continues to experience marked discomfort unless she is lying completely flat, reporting a deep aching sensation radiating to her left hip and occasional numbness in her leg after sitting. She has been intermittently using gabapentin and has discontinued tramadol due to perceived lack of benefit. She is motivated to pursue surgical intervention  given the unremitting nature of her symptoms and the failure of conservative measures, including physical therapy and epidural injections.    INTERVAL HISTORY 3/12/2025 (from David Alcantara PA-C):  Cristina Gonzalez is recovering very well from a surgical standpoint.  She notes significant improvement in her preoperative radicular pain.  She notes some discomfort in her back that radiates laterally.  She has continued to take her gabapentin and is utilizing Tylenol.  She has discontinued Robaxin and has not needed narcotics.  She notes a sensation in her left lateral leg, which she describes as \"a sunburn\" feeling.  She also notes that similar sensation in her right groin.  Otherwise, no new neurologic complaints.  She is getting around well.  She has been able to walk a mile on her own without any issues.  She was able to travel to [place] a couple of weeks after surgery.  Denies any issues with her incision site.  She is overall pleased with her progress up to this point.  She had been working with Nuha Crandall of physical therapy and is interested in returning to her in the next few weeks for postoperative rehabilitation and core strengthening.     INTERVAL HISTORY 4/3/2025:  Cristina Gonzalez  returns for her six-week postoperative follow-up after minimally invasive left L4-5 and L5-S1 laminoforaminotomies, partial medial facetectomies, and microdiscectomy performed on 2/19/2025. She reports feeling significantly better compared to her preoperative status, with substantial improvement in her radicular pain. She notes only occasional mild discomfort, which she attributes more to muscular soreness. She has not yet initiated formal physical therapy but is requesting an order to begin. She has been taking pregabalin (Lyrica) 150 mg twice daily but is now amenable to tapering since she occasionally forgets a dose without noticeable increase in pain. She denies any new bowel or bladder dysfunction, and her incision  is well-healed without complications.    PAST MEDICAL HISTORY:  Past Medical History:    Allergic rhinitis    Anesthesia complication    Anxiety state, unspecified    Back problem    Backache, unspecified    Carotid stenosis    Diverticulosis of large intestine    Esophageal reflux    Fibroid    IBS    IBS (irritable bowel syndrome)    Migraines    RYLAN (obstructive sleep apnea)    AHI-6    Other forms of migraine    PONV (postoperative nausea and vomiting)    Pseudoaneurysm of carotid artery    left ICA, had small dissection    Vitamin D insufficiency     PAST SURGICAL HISTORY:  Past Surgical History:   Procedure Laterality Date    Colonoscopy  2/17/17= Diverticulosis    Colonoscopy N/A 02/17/2017    Procedure: COLONOSCOPY, POSSIBLE BIOPSY, POSSIBLE POLYPECTOMY 95683;  Surgeon: Jt Romero MD;  Location: [facility]    Colonoscopy  08/23/19:  Diverticulosis, Hemorrhoids    done at [facility]. Repeat 2029.  Needs MAC    Other surgical history Bilateral     ovarian tumor    Special service or report       benign tumor of uterus - fibrothecoma     Special service or report       B. knee arthroscopy    Upper gi endoscopy - referral  03/2013    mild esophagitis.  SB Bx negative    Upper gi endoscopy performed  08/23/19:  Hiatal hernia    Upper gi endoscopy,biopsy  03/07/2013    Procedure: ESOPHAGOGASTRODUODENOSCOPY, POSSIBLE BIOPSY, POSSIBLE POLYPECTOMY 34619;  Surgeon: Jt Romero MD;  Location: [facility]     FAMILY HISTORY:  family history includes Anxiety in her mother; Cancer in her maternal grandfather; Dementia in her father and paternal grandmother; Diabetes in her paternal grandmother; Heart Attack in her paternal grandfather; Heart Disorder in her paternal grandfather; Other in her daughter, father, maternal grandmother, and sister; Psychiatric in her mother and sister; Stroke in her maternal grandmother; Tremor in her father.    SOCIAL HISTORY:   reports that she quit smoking  about 29 years ago. Her smoking use included cigarettes. She started smoking about 31 years ago. She has a 0.4 pack-year smoking history. She has never used smokeless tobacco. She reports current alcohol use. She reports that she does not use drugs.    ALLERGIES:  Allergies[1]    MEDICATIONS:  Medications Ordered Prior to Encounter[2]    REVIEW OF SYSTEMS:  All other systems were reviewed and were negative except for those previously mentioned in the HPI    PHYSICAL EXAMINATION:  General: No acute distress.  Respiratory: Non-labored respirations bilaterally. No audible wheezing  Cardiovascular: Extremities warm and well-perfused.  Abdomen: Soft, nontender, nondistended.   Musculoskeletal: Moves all extremities well, symmetrically.  Extremities: No edema.    NEUROLOGIC EXAMINATION:  Mental status: Alert and oriented x 3  Speech: Clear, fluent  Cranial nerves: PERRLA, EOMI, face symmetric, with normal strength and sensation, tongue and palate midline, SCM 5/5 bilaterally  Motor:     RIGHT  Delt 5/5   Bic 5/5  Tri 5/5   HI 5/5    5/5  IP 5/5   Quad 5/5   Ham 5/5   AT 5/5   EHL 5/5 Brendan 5/5     LEFT    Delt 5/5   Bic 5/5  Tri 5/5   HI 5/5    5/5  IP 5/5   Quad 5/5   Ham 5/5   AT 5/5   EHL 5/5 Brendan 5/5   No pronator drift  Tone: Normal  Atrophy/Fasciculations: None  Sensation: Normal to light touch, symmetric, no neglect  Cerebellar: Normal finger nose finger  Gait: Normal, nondistressed heel toe tandem gait      Reflexes: 2+ throughout, symmetric, no Lui's    IMAGING:  MR Lumbar 12/13/2024: Demonstrates sacralization at L5 with notable degenerative changes at L4-5 and L5-S1. There is a sizable left paracentral disc herniation at L4-5 causing marked compression of the traversing nerve roots in the lateral recess. At L5-S1, a lesser degree of bulging and foraminal narrowing is noted. These findings correlate with her left-sided radicular symptoms. Of note, the left S1 nerve root appears larger than expected.  This could be a result of prior injury (neuroma), ongoing swelling and inflammation, or possibly a benign nerve root mass (schwannoma).           ASSESSMENT:  Ms. Gonzalez is six weeks out from a minimally invasive lumbar decompression at L4-5 and L5-S1. Clinically, she exhibits marked improvement in her preoperative radicular symptoms. She has normal strength and sensation on exam, and her gait is stable. Although residual low-grade discomfort is present, it appears predominantly muscular rather than neuropathic. In light of her positive clinical course and lack of any alarming features, she remains on track for continued recovery. Gradual tapering of pregabalin is appropriate, provided symptoms remain stable; otherwise, she will resume or slow the taper to maintain comfort. No new concerns arise that alter her postoperative diagnosis or require additional surgical intervention.    PLAN:  - Provided a prescription for formal physical therapy to optimize core strengthening and mobility.  - Begin a gradual taper of pregabalin: discontinue the morning dose first and monitor pain response over 1-2 weeks before further reductions.  - Advised low-impact exercise (e.g., treadmill walking) and continue to avoid heavy lifting or twisting until 12 weeks postoperatively.  - Recommended scar management as desired for cosmetic reasons.  - Reinforced red-flag precautions, instructing her to contact me urgently if she experiences worsening weakness or bowel/bladder dysfunction.  - Follow up in approximately 6 weeks (around the 12-week connie) for reassessment and possible lifting of remaining activity restrictions.    Gregorio Martin MD  Neurological Surgery    [facility] [address], [address]  [place], [place] [address] [phone]  Pager [phone]  4/3/2025 10:09 AM      This note was created using a voice-recognition transcribing system. Incorrect words or phrases may have been missed  during proofreading. Please interpret accordingly.    Total Time    Post-Operative Patient Total Time       30  minutes.      Activities       Preparing to see the patient (chart/tests/imaging review).       Obtaining and/or reviewing separately obtained history.       Performing a medically appropriate examination and/or evaluation.       Counseling and educating the patient/family/caregiver.       Ordering medications, tests, or procedures.       Referring and communicating with other health care professionals (when not separately reported).       Documenting clincal information in the electronic or other health record.       Independently interpreting results (not separately reported).    Communicating results to the patient/family/caregiver.    Care coordination (not separately reported).       [1]   Allergies  Allergen Reactions    Sulfa Antibiotics HIVES     Hives to arms   [2]   Current Outpatient Medications on File Prior to Visit   Medication Sig Dispense Refill    amphetamine-dextroamphetamine 5 MG Oral Tab Take 1 tablet by mouth 2 (two) times daily.      acetaminophen 500 MG Oral Tab Take 1 tablet (500 mg total) by mouth every 4 (four) hours as needed. 120 tablet 0    methocarbamol 500 MG Oral Tab Take 1 tablet (500 mg total) by mouth 3 (three) times daily as needed. 60 tablet 0    oxyCODONE 5 MG Oral Tab Take 1 tablet (5 mg total) by mouth every 4 (four) hours as needed. 30 tablet 0    pregabalin 150 MG Oral Cap Take 1 capsule (150 mg total) by mouth 2 (two) times daily. 60 capsule 1    ALPRAZolam 0.5 MG Oral Tab Take 1 tablet (0.5 mg total) by mouth 2 (two) times daily.      ZAVZPRET 10 MG/ACT Nasal Solution 10 mg by Nasal route.      WEGOVY 2.4 MG/0.75ML Subcutaneous Solution Auto-injector INJECT 2.4 MG SUBCUTANEOUSLY ONE TIME PER WEEK FOR 84 DAYS      Estradiol Starter Pack (IMVEXXY STARTER PACK) 4 MCG Vaginal Insert PLACE 4 MCG VAGINALLY DAILY.      AJOVY 225 MG/1.5ML Subcutaneous Solution  Auto-injector Inject 225 mg into the skin every 30 (thirty) days.      Meloxicam 15 MG Oral Tab Take 1 tablet (15 mg total) by mouth daily. 30 tablet 0    venlafaxine 150 MG Oral Capsule SR 24 Hr 1 capsule (150 mg total) every morning.      PANTOPRAZOLE 40 MG Oral Tab EC TAKE 1 TABLET (40 MG TOTAL) BY MOUTH 2 (TWO) TIMES DAILY BEFORE MEALS. (Patient taking differently: Take 1 tablet (40 mg total) by mouth every morning before breakfast.) 180 tablet 3    ondansetron 4 MG Oral Tablet Dispersible Take 1 tablet (4 mg total) by mouth every 8 (eight) hours as needed for Nausea. 20 tablet 0    Riboflavin 400 MG Oral Tab Take by mouth.      Coenzyme Q10 (CO Q 10 OR) Take 300 mg by mouth.      ergocalciferol 1.25 MG (62785 UT) Oral Cap Take 1 capsule (50,000 Units total) by mouth every 14 (fourteen) days. 6 capsule 3    Hyoscyamine Sulfate (LEVSIN) 0.125 MG Oral Tab Take 1 tablet (125 mcg total) by mouth every 6 (six) hours as needed for Cramping. 60 tablet 3    Aspirin 81 MG Oral Cap Take 81 mg by mouth daily.      Meclizine HCl 25 MG Oral Tab Take 1 tablet (25 mg total) by mouth 3 (three) times daily as needed. 20 tablet 2    fexofenadine 180 MG Oral Tab Take 1 tablet (180 mg total) by mouth every morning.      MULTI-VITAMIN OR TABS 1 TABLET DAILY       No current facility-administered medications on file prior to visit.

## 2025-04-16 DIAGNOSIS — M51.362 DEGENERATION OF INTERVERTEBRAL DISC OF LUMBAR REGION WITH DISCOGENIC BACK PAIN AND LOWER EXTREMITY PAIN: ICD-10-CM

## 2025-04-16 DIAGNOSIS — M48.061 STENOSIS OF LATERAL RECESS OF LUMBAR SPINE: ICD-10-CM

## 2025-04-16 DIAGNOSIS — M47.816 LUMBAR SPONDYLOSIS: ICD-10-CM

## 2025-04-16 DIAGNOSIS — M54.16 LUMBAR RADICULOPATHY: ICD-10-CM

## 2025-04-16 RX ORDER — PREGABALIN 150 MG/1
150 CAPSULE ORAL 2 TIMES DAILY
Qty: 60 CAPSULE | Refills: 1 | Status: SHIPPED | OUTPATIENT
Start: 2025-04-16

## 2025-04-16 NOTE — TELEPHONE ENCOUNTER
Refill request received from patient.    Patient states she attempted to wean herself off of this to every other night thinking she was doing better. Patient states after she did that she started having more nerve feeling that started to move up her calf to her knee. Patient discussed with PT as well.     Patient requesting refill on Lyrica to continue medication every night instead of trying to wean it to every other night.     Medication: pregabalin 150 MG Oral Cap      Date of last refill: 2/06/25 (#60/1)  [place] 3/06/25     Last office visit: 4/03/25  Due back to clinic per last office note:  6 weeks   Date next office visit scheduled:    No future appointments.        Last OV note recommendation:    \"ASSESSMENT:  Ms. Gonzalez is six weeks out from a minimally invasive lumbar decompression at L4-5 and L5-S1. Clinically, she exhibits marked improvement in her preoperative radicular symptoms. She has normal strength and sensation on exam, and her gait is stable. Although residual low-grade discomfort is present, it appears predominantly muscular rather than neuropathic. In light of her positive clinical course and lack of any alarming features, she remains on track for continued recovery. Gradual tapering of pregabalin is appropriate, provided symptoms remain stable; otherwise, she will resume or slow the taper to maintain comfort. No new concerns arise that alter her postoperative diagnosis or require additional surgical intervention.     PLAN:  - Provided a prescription for formal physical therapy to optimize core strengthening and mobility.  - Begin a gradual taper of pregabalin: discontinue the morning dose first and monitor pain response over 1-2 weeks before further reductions.  - Advised low-impact exercise (e.g., treadmill walking) and continue to avoid heavy lifting or twisting until 12 weeks postoperatively.  - Recommended scar management as desired for cosmetic  reasons.  - Reinforced red-flag precautions, instructing her to contact me urgently if she experiences worsening weakness or bowel/bladder dysfunction.  - Follow up in approximately 6 weeks (around the 12-week connie) for reassessment and possible lifting of remaining activity restrictions.\"    Routed to Provider.

## 2025-06-16 ENCOUNTER — OFFICE VISIT (OUTPATIENT)
Dept: SURGERY | Facility: CLINIC | Age: 50
End: 2025-06-16
Payer: COMMERCIAL

## 2025-06-16 VITALS
OXYGEN SATURATION: 98 % | SYSTOLIC BLOOD PRESSURE: 103 MMHG | DIASTOLIC BLOOD PRESSURE: 68 MMHG | BODY MASS INDEX: 23.19 KG/M2 | HEART RATE: 55 BPM | HEIGHT: 68 IN | WEIGHT: 153 LBS

## 2025-06-16 DIAGNOSIS — M51.16 LUMBAR DISC HERNIATION WITH RADICULOPATHY: ICD-10-CM

## 2025-06-16 DIAGNOSIS — M48.061 STENOSIS OF LATERAL RECESS OF LUMBAR SPINE: ICD-10-CM

## 2025-06-16 DIAGNOSIS — M47.816 LUMBAR SPONDYLOSIS: ICD-10-CM

## 2025-06-16 DIAGNOSIS — Z98.890 S/P LUMBAR MICRODISCECTOMY: ICD-10-CM

## 2025-06-16 DIAGNOSIS — M95.5 PELVIC OBLIQUITY: ICD-10-CM

## 2025-06-16 DIAGNOSIS — M54.16 LUMBAR RADICULITIS: ICD-10-CM

## 2025-06-16 DIAGNOSIS — M54.9 MUSCULOSKELETAL BACK PAIN: ICD-10-CM

## 2025-06-16 DIAGNOSIS — M54.16 LUMBAR RADICULOPATHY: ICD-10-CM

## 2025-06-16 DIAGNOSIS — M21.70 LEG LENGTH DISCREPANCY: Primary | ICD-10-CM

## 2025-06-16 DIAGNOSIS — M51.362 DEGENERATION OF INTERVERTEBRAL DISC OF LUMBAR REGION WITH DISCOGENIC BACK PAIN AND LOWER EXTREMITY PAIN: ICD-10-CM

## 2025-06-16 PROCEDURE — 99024 POSTOP FOLLOW-UP VISIT: CPT | Performed by: STUDENT IN AN ORGANIZED HEALTH CARE EDUCATION/TRAINING PROGRAM

## 2025-06-16 NOTE — PROGRESS NOTES
The following individual(s) verbally consented to be recorded using ambient AI listening technology and understand that they can each withdraw their consent to this listening technology at any point by asking the clinician to turn off or pause the recording:    Patient name: Cristina Gonzalez  Additional names:      Patient is taking 1 tablet of Lyrica and Ibuprofen 400 mg BID. Patient is in PT ongoing for core strengthening in back.

## 2025-06-16 NOTE — PROGRESS NOTES
[facility]  Neurological Surgery Post-Operative Patient Clinic Note    Cristina Gonzalez  9/13/1975  [id number]  PCP: Faby Coombs MD  Referring Provider: MATILDE Hardin    REASON FOR VISIT:  Post-operative follow-up; new onset low back and hip pain.    NEUROSURGICAL PROCEDURES TO DATE:  2/19/2025 - MIS left L4-5, L5-S1 laminoforaminotomies and partial medial facetectomies and microdiscectomies     HISTORY OF PRESENT ILLNESS 1/13/2025:  Cristina Gonzalez is a(n) 49 year old new patient presenting with approximately two months of severe left-sided low back pain, radiating into the hip, groin, and occasionally into the calf. She traces the onset to November 2024, around the time she increased her exercise routine and took on more lifting responsibilities at home. Despite physical therapy, NSAIDs, muscle relaxants, and one lumbar epidural steroid injection on December 26, 2024, she reports only minimal relief. She denies new bowel or bladder dysfunction but describes difficulty sitting for extended periods and occasional numbness in the left leg after prolonged sitting. Her prior history includes episodic back pain dating to the early 2000s, managed conservatively until this more acute exacerbation. She has not had spinal surgery. Another epidural injection is planned this week, but she is considering surgical evaluation if conservative measures fail.    INTERVAL HISTORY 1/22/2025:  Cristina Gonzalez returns following her recent lumbar transforaminal epidural injection, which afforded only transient, minimal relief of her persistent low back and left leg pain. She continues to experience marked discomfort unless she is lying completely flat, reporting a deep aching sensation radiating to her left hip and occasional numbness in her leg after sitting. She has been intermittently using gabapentin and has discontinued tramadol due to perceived lack of benefit. She is  motivated to pursue surgical intervention given the unremitting nature of her symptoms and the failure of conservative measures, including physical therapy and epidural injections.    INTERVAL HISTORY 3/12/2025 (from David Alcantara PA-C):  Cristina Gonzalez is recovering very well from a surgical standpoint.  She notes significant improvement in her preoperative radicular pain.  She notes some discomfort in her back that radiates laterally.  She has continued to take her gabapentin and is utilizing Tylenol.  She has discontinued Robaxin and has not needed narcotics.  She notes a sensation in her left lateral leg, which she describes as \"a sunburn\" feeling.  She also notes that similar sensation in her right groin.  Otherwise, no new neurologic complaints.  She is getting around well.  She has been able to walk a mile on her own without any issues.  She was able to travel to [place] a couple of weeks after surgery.  Denies any issues with her incision site.  She is overall pleased with her progress up to this point.  She had been working with Nuha Crandall of physical therapy and is interested in returning to her in the next few weeks for postoperative rehabilitation and core strengthening.     INTERVAL HISTORY 4/3/2025:  Cristina Gonzalez  returns for her six-week postoperative follow-up after minimally invasive left L4-5 and L5-S1 laminoforaminotomies, partial medial facetectomies, and microdiscectomy performed on 2/19/2025. She reports feeling significantly better compared to her preoperative status, with substantial improvement in her radicular pain. She notes only occasional mild discomfort, which she attributes more to muscular soreness. She has not yet initiated formal physical therapy but is requesting an order to begin. She has been taking pregabalin (Lyrica) 150 mg twice daily but is now amenable to tapering since she occasionally forgets a dose without noticeable increase in pain. She denies any new bowel  or bladder dysfunction, and her incision is well-healed without complications.    INTERVAL HISTORY 6/16/2025:  Cristina Gonzalez is approximately four months status post L4-S1 minimally invasive decompression, presenting for follow-up. She reports that her original preoperative radicular pain has not returned. However, beginning approximately five weeks ago, she developed a new character of pain, which she describes as located \"straight out\" from her right hip, with occasional radiation to the calf. These symptoms have been waxing and waning and were more severe until this past week. She notes some improvement with a specific physical therapy maneuver targeting the sacroiliac (SI) joint. The patient expresses significant anxiety that she may have done something to compromise her surgical recovery. She is more active now but denies specific heavy lifting, though she is carrying a backpack and lifting items in and out of her car. She reports she independently tapered her Lyrica (pregabalin) from twice daily to once daily due to concerns about weight gain and has felt better since doing so. She continues with physical therapy with Nuha Crandall. She notes that her physical therapist has previously told her that her hips are uneven.    PAST MEDICAL HISTORY:  Past Medical History:    Allergic rhinitis    Anesthesia complication    Anxiety state, unspecified    Back problem    Backache, unspecified    Carotid stenosis    Diverticulosis of large intestine    Esophageal reflux    Fibroid    IBS    IBS (irritable bowel syndrome)    Migraines    RYLAN (obstructive sleep apnea)    AHI-6    Other forms of migraine    PONV (postoperative nausea and vomiting)    Pseudoaneurysm of carotid artery    left ICA, had small dissection    Vitamin D insufficiency     PAST SURGICAL HISTORY:  Past Surgical History:   Procedure Laterality Date    Colonoscopy  2/17/17= Diverticulosis    Colonoscopy N/A 02/17/2017    Procedure: COLONOSCOPY,  POSSIBLE BIOPSY, POSSIBLE POLYPECTOMY 46167;  Surgeon: Jt Romero MD;  Location: [facility] [place] [place] [facility]    Colonoscopy  08/23/19:  Diverticulosis, Hemorrhoids    done at [facility]. Repeat 2029.  Needs MAC    Other surgical history Bilateral     ovarian tumor    Special service or report       benign tumor of uterus - fibrothecoma     Special service or report       B. knee arthroscopy    Upper gi endoscopy - referral  03/2013    mild esophagitis.  SB Bx negative    Upper gi endoscopy performed  08/23/19:  Hiatal hernia    Upper gi endoscopy,biopsy  03/07/2013    Procedure: ESOPHAGOGASTRODUODENOSCOPY, POSSIBLE BIOPSY, POSSIBLE POLYPECTOMY 15265;  Surgeon: Jt Romero MD;  Location: [facility]     FAMILY HISTORY:  family history includes Anxiety in her mother; Cancer in her maternal grandfather; Dementia in her father and paternal grandmother; Diabetes in her paternal grandmother; Heart Attack in her paternal grandfather; Heart Disorder in her paternal grandfather; Other in her daughter, father, maternal grandmother, and sister; Psychiatric in her mother and sister; Stroke in her maternal grandmother; Tremor in her father.    SOCIAL HISTORY:   reports that she quit smoking about 29 years ago. Her smoking use included cigarettes. She started smoking about 31 years ago. She has a 0.4 pack-year smoking history. She has never used smokeless tobacco. She reports current alcohol use. She reports that she does not use drugs.    ALLERGIES:  Allergies[1]    MEDICATIONS:  Medications Ordered Prior to Encounter[2]    REVIEW OF SYSTEMS:  All other systems were reviewed and were negative except for those previously mentioned in the HPI    PHYSICAL EXAMINATION:  General: No acute distress.  Respiratory: Non-labored respirations bilaterally. No audible wheezing  Cardiovascular: Extremities warm and well-perfused.  Abdomen: Soft, nontender, nondistended.   Musculoskeletal: Moves all extremities well,  symmetrically.  Extremities: No edema.    NEUROLOGIC EXAMINATION:  Mental status: Alert and oriented x 3  Speech: Clear, fluent  Cranial nerves: PERRLA, EOMI, face symmetric, with normal strength and sensation, tongue and palate midline, SCM 5/5 bilaterally  Motor:     RIGHT  Delt 5/5   Bic 5/5  Tri 5/5   HI 5/5    5/5  IP 5/5   Quad 5/5   Ham 5/5   AT 5/5   EHL 5/5 Brendan 5/5     LEFT    Delt 5/5   Bic 5/5  Tri 5/5   HI 5/5    5/5  IP 5/5   Quad 5/5   Ham 5/5   AT 5/5   EHL 5/5 Brendan 5/5   No pronator drift  Tone: Normal  Atrophy/Fasciculations: None  Sensation: Normal to light touch, symmetric, no neglect  Cerebellar: Normal finger nose finger  Gait: Normal, nondistressed heel toe tandem gait      Reflexes: 2+ throughout, symmetric, no Lui's    IMAGING:  MR lumbar 12/13/2024: Demonstrates sacralization at L5 with notable degenerative changes at L4-5 and L5-S1. There is a sizable left paracentral disc herniation at L4-5 causing marked compression of the traversing nerve roots in the lateral recess. At L5-S1, a lesser degree of bulging and foraminal narrowing is noted. These findings correlate with her left-sided radicular symptoms. Of note, the left S1 nerve root appears larger than expected. This could be a result of prior injury (neuroma), ongoing swelling and inflammation, or possibly a benign nerve root mass (schwannoma).           ASSESSMENT:  Ms. Gonzalez is a 49-year-old female, four months status post lumbar decompression, who presents with new-onset right buttock and hip pain consistent with sacroiliac (SI) joint inflammation and myofascial pain. Her original presenting radicular symptoms remain resolved. The physical examination reveals a clinically significant leg length discrepancy, with the right leg appearing shorter, leading to pelvic obliquity (left hip high) and a compensatory spinal posture. This long-standing biomechanical imbalance, likely exacerbated by her increased activity  postoperatively, is the most probable etiology of her current symptoms. The asymmetric loading has likely led to inflammation of the right SI joint and surrounding musculature. By addressing the foundational leg length discrepancy, the goal is to correct the compensatory mechanics, alleviate the current pain, and prevent future adjacent segment degeneration.    PLAN:  - Order standing scoliosis protocol X-rays to objectively measure and confirm the leg length discrepancy.  - Provided a referral to a podiatrist, Dr. Cheryl Dillard, for formal evaluation and consideration of custom orthotics to correct the leg length discrepancy.  - Advised patient to discontinue her remaining dose of pregabalin (Lyrica), as her current symptoms are mechanical, not neuropathic.  -  patient to try a temporary over-the-counter heel lift in her right shoe to assess for immediate symptomatic response pending formal podiatry evaluation.  - Recommend continuing with physical therapy, with an emphasis on non-gravity-loaded core strengthening exercises (e.g., while sitting or lying down) until the leg length discrepancy is addressed with orthotics.  - Patient counseled extensively on the diagnosis of leg length discrepancy and how it contributes to her current pain syndrome. All questions were answered.  - Follow up in clinic at regularly scheduled interval, next in 3 months (6 months since surgery)    Gregorio Martin MD  Neurological Surgery    [address], [address] [place] [address] [phone]  Pager [phone]  6/16/2025      This note was created using a voice-recognition transcribing system. Incorrect words or phrases may have been missed during proofreading. Please interpret accordingly.    Total Time    Post-Operative Patient Total Time       40  minutes.      Activities       Preparing to see the patient (chart/tests/imaging review).       Obtaining and/or  reviewing separately obtained history.       Performing a medically appropriate examination and/or evaluation.       Counseling and educating the patient/family/caregiver.       Ordering medications, tests, or procedures.       Referring and communicating with other health care professionals (when not separately reported).       Documenting clincal information in the electronic or other health record.       Independently interpreting results (not separately reported).    Communicating results to the patient/family/caregiver.    Care coordination (not separately reported)       [1]   Allergies  Allergen Reactions    Sulfa Antibiotics HIVES     Hives to arms   [2]   Current Outpatient Medications on File Prior to Visit   Medication Sig Dispense Refill    pregabalin 150 MG Oral Cap Take 1 capsule (150 mg total) by mouth 2 (two) times daily. 60 capsule 1    amphetamine-dextroamphetamine 5 MG Oral Tab Take 1 tablet by mouth 2 (two) times daily.      acetaminophen 500 MG Oral Tab Take 1 tablet (500 mg total) by mouth every 4 (four) hours as needed. 120 tablet 0    methocarbamol 500 MG Oral Tab Take 1 tablet (500 mg total) by mouth 3 (three) times daily as needed. 60 tablet 0    oxyCODONE 5 MG Oral Tab Take 1 tablet (5 mg total) by mouth every 4 (four) hours as needed. 30 tablet 0    ALPRAZolam 0.5 MG Oral Tab Take 1 tablet (0.5 mg total) by mouth 2 (two) times daily.      ZAVZPRET 10 MG/ACT Nasal Solution 10 mg by Nasal route.      WEGOVY 2.4 MG/0.75ML Subcutaneous Solution Auto-injector INJECT 2.4 MG SUBCUTANEOUSLY ONE TIME PER WEEK FOR 84 DAYS      Estradiol Starter Pack (IMVEXXY STARTER PACK) 4 MCG Vaginal Insert PLACE 4 MCG VAGINALLY DAILY.      AJOVY 225 MG/1.5ML Subcutaneous Solution Auto-injector Inject 225 mg into the skin every 30 (thirty) days.      Meloxicam 15 MG Oral Tab Take 1 tablet (15 mg total) by mouth daily. 30 tablet 0    venlafaxine 150 MG Oral Capsule SR 24 Hr 1 capsule (150 mg total) every morning.       PANTOPRAZOLE 40 MG Oral Tab EC TAKE 1 TABLET (40 MG TOTAL) BY MOUTH 2 (TWO) TIMES DAILY BEFORE MEALS. (Patient taking differently: Take 1 tablet (40 mg total) by mouth every morning before breakfast.) 180 tablet 3    ondansetron 4 MG Oral Tablet Dispersible Take 1 tablet (4 mg total) by mouth every 8 (eight) hours as needed for Nausea. 20 tablet 0    Riboflavin 400 MG Oral Tab Take by mouth.      Coenzyme Q10 (CO Q 10 OR) Take 300 mg by mouth.      ergocalciferol 1.25 MG (01875 UT) Oral Cap Take 1 capsule (50,000 Units total) by mouth every 14 (fourteen) days. 6 capsule 3    Hyoscyamine Sulfate (LEVSIN) 0.125 MG Oral Tab Take 1 tablet (125 mcg total) by mouth every 6 (six) hours as needed for Cramping. 60 tablet 3    Aspirin 81 MG Oral Cap Take 81 mg by mouth daily.      Meclizine HCl 25 MG Oral Tab Take 1 tablet (25 mg total) by mouth 3 (three) times daily as needed. 20 tablet 2    fexofenadine 180 MG Oral Tab Take 1 tablet (180 mg total) by mouth every morning.      MULTI-VITAMIN OR TABS 1 TABLET DAILY       No current facility-administered medications on file prior to visit.

## 2025-06-24 ENCOUNTER — HOSPITAL ENCOUNTER (OUTPATIENT)
Dept: GENERAL RADIOLOGY | Facility: HOSPITAL | Age: 50
Discharge: HOME | End: 2025-06-24
Attending: STUDENT IN AN ORGANIZED HEALTH CARE EDUCATION/TRAINING PROGRAM
Payer: COMMERCIAL

## 2025-06-24 DIAGNOSIS — M54.16 LUMBAR RADICULOPATHY: ICD-10-CM

## 2025-06-24 DIAGNOSIS — M21.70 LEG LENGTH DISCREPANCY: ICD-10-CM

## 2025-06-24 DIAGNOSIS — M51.16 LUMBAR DISC HERNIATION WITH RADICULOPATHY: ICD-10-CM

## 2025-06-24 DIAGNOSIS — Z98.890 S/P LUMBAR MICRODISCECTOMY: ICD-10-CM

## 2025-06-24 DIAGNOSIS — M54.9 MUSCULOSKELETAL BACK PAIN: ICD-10-CM

## 2025-06-24 DIAGNOSIS — M48.061 STENOSIS OF LATERAL RECESS OF LUMBAR SPINE: ICD-10-CM

## 2025-06-24 DIAGNOSIS — M47.816 LUMBAR SPONDYLOSIS: ICD-10-CM

## 2025-06-24 DIAGNOSIS — M51.362 DEGENERATION OF INTERVERTEBRAL DISC OF LUMBAR REGION WITH DISCOGENIC BACK PAIN AND LOWER EXTREMITY PAIN: ICD-10-CM

## 2025-06-24 DIAGNOSIS — M54.16 LUMBAR RADICULITIS: ICD-10-CM

## 2025-06-24 DIAGNOSIS — M95.5 PELVIC OBLIQUITY: ICD-10-CM

## 2025-06-24 PROCEDURE — 72082 X-RAY EXAM ENTIRE SPI 2/3 VW: CPT | Performed by: STUDENT IN AN ORGANIZED HEALTH CARE EDUCATION/TRAINING PROGRAM

## 2025-06-24 PROCEDURE — 77073 BONE LENGTH STUDIES: CPT | Performed by: STUDENT IN AN ORGANIZED HEALTH CARE EDUCATION/TRAINING PROGRAM

## 2025-06-28 ENCOUNTER — E-ADVICE (OUTPATIENT)
Dept: NEUROSURGERY | Age: 50
End: 2025-06-28

## 2025-06-28 DIAGNOSIS — N95.2 ATROPHIC VAGINITIS: ICD-10-CM

## 2025-06-30 RX ORDER — ESTRADIOL 2 MG/1
1 RING VAGINAL ONCE
Qty: 3 EACH | Refills: 1 | Status: SHIPPED | OUTPATIENT
Start: 2025-06-30 | End: 2025-06-30

## 2025-07-02 ENCOUNTER — PATIENT MESSAGE (OUTPATIENT)
Dept: SURGERY | Facility: CLINIC | Age: 50
End: 2025-07-02

## 2025-07-03 NOTE — TELEPHONE ENCOUNTER
Message below noted.    Patient obtained XR's 6/24/25 and is requesting Provider feedback and thoughts on next steps.     Noted images are viewable, but report is not yet complete.     LOV 6/16/25  \"ASSESSMENT:  Ms. Gonzalez is a 49-year-old female, four months status post lumbar decompression, who presents with new-onset right buttock and hip pain consistent with sacroiliac (SI) joint inflammation and myofascial pain. Her original presenting radicular symptoms remain resolved. The physical examination reveals a clinically significant leg length discrepancy, with the right leg appearing shorter, leading to pelvic obliquity (left hip high) and a compensatory spinal posture. This long-standing biomechanical imbalance, likely exacerbated by her increased activity postoperatively, is the most probable etiology of her current symptoms. The asymmetric loading has likely led to inflammation of the right SI joint and surrounding musculature. By addressing the foundational leg length discrepancy, the goal is to correct the compensatory mechanics, alleviate the current pain, and prevent future adjacent segment degeneration.     PLAN:  - Order standing scoliosis protocol X-rays to objectively measure and confirm the leg length discrepancy.  - Provided a referral to a podiatrist, Dr. Cheryl Dillard, for formal evaluation and consideration of custom orthotics to correct the leg length discrepancy.  - Advised patient to discontinue her remaining dose of pregabalin (Lyrica), as her current symptoms are mechanical, not neuropathic.  -  patient to try a temporary over-the-counter heel lift in her right shoe to assess for immediate symptomatic response pending formal podiatry evaluation.  - Recommend continuing with physical therapy, with an emphasis on non-gravity-loaded core strengthening exercises (e.g., while sitting or lying down) until the leg length discrepancy is addressed with orthotics.  - Patient counseled extensively  on the diagnosis of leg length discrepancy and how it contributes to her current pain syndrome. All questions were answered.  - Follow up in clinic at regularly scheduled interval, next in 3 months (6 months since surgery)\"    Routed to Provider.

## 2025-07-08 ENCOUNTER — TELEPHONE (OUTPATIENT)
Dept: SURGERY | Facility: CLINIC | Age: 50
End: 2025-07-08

## 2025-07-08 DIAGNOSIS — M48.061 STENOSIS OF LATERAL RECESS OF LUMBAR SPINE: ICD-10-CM

## 2025-07-08 DIAGNOSIS — M51.16 LUMBAR DISC HERNIATION WITH RADICULOPATHY: ICD-10-CM

## 2025-07-08 DIAGNOSIS — M54.16 LUMBAR RADICULITIS: ICD-10-CM

## 2025-07-08 DIAGNOSIS — M54.9 MUSCULOSKELETAL BACK PAIN: ICD-10-CM

## 2025-07-08 DIAGNOSIS — M95.5 PELVIC OBLIQUITY: ICD-10-CM

## 2025-07-08 DIAGNOSIS — M47.816 LUMBAR SPONDYLOSIS: ICD-10-CM

## 2025-07-08 DIAGNOSIS — M54.16 LUMBAR RADICULOPATHY: Primary | ICD-10-CM

## 2025-07-08 DIAGNOSIS — M21.70 LEG LENGTH DISCREPANCY: ICD-10-CM

## 2025-07-08 DIAGNOSIS — M51.362 DEGENERATION OF INTERVERTEBRAL DISC OF LUMBAR REGION WITH DISCOGENIC BACK PAIN AND LOWER EXTREMITY PAIN: ICD-10-CM

## 2025-07-08 DIAGNOSIS — Z98.890 S/P LUMBAR MICRODISCECTOMY: ICD-10-CM

## 2025-07-08 NOTE — TELEPHONE ENCOUNTER
XR scoliosis and bilateral leg surveys 6/24/2025: This radiographic survey reveals a compensatory lumbar levoscoliosis, which appears secondary to pelvic imbalance. The underlying etiology is a significant leg length discrepancy, evidenced by a lower left hemipelvis corresponding to a functionally shorter left lower extremity. The spinal curvatures in the coronal plane are consistent with compensation for this pelvic obliquity. In the sagittal plane, key measurements of PI 75°, LL 52.5°, SS 35°, and an SVA of 5 cm.      Gregorio Martin MD  Neurological Surgery    [facility] [address], [address]  [place] [address]  [phone]  Pager [phone]  7/8/2025 7:40 AM      This note was created using a voice-recognition transcribing system. Incorrect words or phrases may have been missed during proofreading. Please interpret accordingly.

## 2025-07-11 DIAGNOSIS — Z12.31 VISIT FOR SCREENING MAMMOGRAM: Primary | ICD-10-CM

## 2025-07-11 DIAGNOSIS — I72.9 PSEUDOANEURYSM (CMD): Primary | ICD-10-CM

## 2025-07-26 ENCOUNTER — PATIENT MESSAGE (OUTPATIENT)
Dept: SURGERY | Facility: CLINIC | Age: 50
End: 2025-07-26

## 2025-08-01 ENCOUNTER — HOSPITAL ENCOUNTER (OUTPATIENT)
Dept: MRI IMAGING | Age: 50
Discharge: HOME | End: 2025-08-01
Attending: NURSE PRACTITIONER

## 2025-08-01 DIAGNOSIS — I72.9 PSEUDOANEURYSM (CMD): ICD-10-CM

## 2025-08-01 PROCEDURE — 70549 MR ANGIOGRAPH NECK W/O&W/DYE: CPT

## 2025-08-01 PROCEDURE — A9585 GADOBUTROL INJECTION: HCPCS | Performed by: NURSE PRACTITIONER

## 2025-08-01 PROCEDURE — 10002805 HB CONTRAST AGENT: Performed by: NURSE PRACTITIONER

## 2025-08-01 RX ORDER — GADOBUTROL 604.72 MG/ML
7.5 INJECTION INTRAVENOUS ONCE
Status: COMPLETED | OUTPATIENT
Start: 2025-08-01 | End: 2025-08-01

## 2025-08-01 RX ADMIN — GADOBUTROL 7.5 ML: 604.72 INJECTION INTRAVENOUS at 15:40

## 2025-08-13 ENCOUNTER — TELEPHONE (OUTPATIENT)
Dept: NEUROSURGERY | Age: 50
End: 2025-08-13

## 2025-08-26 ENCOUNTER — MED REC SCAN ONLY (OUTPATIENT)
Dept: SURGERY | Facility: CLINIC | Age: 50
End: 2025-08-26

## 2025-08-26 ENCOUNTER — TELEPHONE (OUTPATIENT)
Dept: SURGERY | Facility: CLINIC | Age: 50
End: 2025-08-26

## 2025-09-11 ENCOUNTER — APPOINTMENT (OUTPATIENT)
Dept: MAMMOGRAPHY | Age: 50
End: 2025-09-11

## 2025-09-11 DIAGNOSIS — Z12.31 ENCOUNTER FOR SCREENING MAMMOGRAM FOR MALIGNANT NEOPLASM OF BREAST: ICD-10-CM

## 2025-09-15 ENCOUNTER — APPOINTMENT (OUTPATIENT)
Dept: NEUROSURGERY | Age: 50
End: 2025-09-15

## 2025-10-01 ENCOUNTER — APPOINTMENT (OUTPATIENT)
Dept: OBGYN | Age: 50
End: 2025-10-01

## (undated) DEVICE — INSULATED BLADE ELECTRODE: Brand: EDGE

## (undated) DEVICE — HEMOSTAT KIT SURGIFLO THROM 8ML

## (undated) DEVICE — PENCIL ES BTTN SWCH W/ TIP HOLSTER E-Z CLN

## (undated) DEVICE — PRECISION MATCH HEAD

## (undated) DEVICE — INSULATED BLADE ELECTRODE;CAUTION: FOR MANUFACTURING, PROCESSING, OR REPACKING.: Brand: EDGE

## (undated) DEVICE — GLOVE SUR 7.5 SENSICARE PI MIC PIP CRM PWD F

## (undated) DEVICE — ZZ-CONVERTED-TO-522442- SPONGE 4X4 10PK

## (undated) DEVICE — C-ARMOR C-ARM EQUIPMENT COVERS CLEAR STERILE UNIVERSAL FIT 12 PER CASE: Brand: C-ARMOR

## (undated) DEVICE — TELFA ADHESIVE ISLAND DRESSING: Brand: TELFA

## (undated) DEVICE — INTENDED USE FOR SURGICAL MARKING ON INTACT SKIN, ALSO PROVIDES A PERMANENT METHOD OF IDENTIFYING OBJECTS IN THE OPERATING ROOM: Brand: WRITESITE® PLUS MINI PREP RESISTANT MARKER

## (undated) DEVICE — PACK,UNIVERSAL,NO GOWNS: Brand: MEDLINE

## (undated) DEVICE — GLOVE SUR 8 SENSICARE PI MIC PIP CRM PWD F

## (undated) DEVICE — SHEET,DRAPE,53X77,STERILE: Brand: MEDLINE

## (undated) DEVICE — ADHESIVE LIQ 2/3ML VI MASTISOL

## (undated) DEVICE — TUBING MEGADYNE SPECULUM

## (undated) DEVICE — SUT MCRYL 4-0 18IN PS-2 ABSRB UD 19MM 3/8 CIR

## (undated) DEVICE — GLOVE SUR 8 SENSICARE NEOPR PWD F

## (undated) DEVICE — FRAZIER SUCTION INSTRUMENT 12 FR W/CONTROL VENT & OBTURATOR: Brand: FRAZIER

## (undated) DEVICE — SPK10329 JACKSON KIT: Brand: SPK10329 JACKSON KIT

## (undated) DEVICE — PACK CDS LAMINECTOMY

## (undated) NOTE — LETTER
Patient Name: Jasmin Guan  YOB: 1975          MRN number:  [id number]  Date:  12/19/2018  Referring Physician: Kassi Wallace     LUMBAR SPINE EVALUATION:    Referring Physician: Dr. 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 No significant degree of stenosis of  the true lumen which maintains antegrade flow.  There is decreased lumbar extension, a 3 plus finger diastasis, poor abdominal control with increased use of accessory muscles.   She also displays decreased endurance, poor balance and functional limitations that include bending, lifting, limited sitting tolerance, I certify the need for these services furnished under this plan of treatment and while under my care.     X___________________________________________________ Date____________________    Certification From: 52/96/8372  To:3/17/2019

## (undated) NOTE — LETTER
[facility]     [x] NEW APPLICANT  [address] [place] [id number]  [] RENEWAL            Persons with Disabilities Certification for Parking Placard  *This form is valid for three months from your physician's signature date for a Temporary Placard and six months for a Permanent Placard.    NOTE TO ALL DISABILITY LICENSE PLATE OWNERS:  If you have a disability license plate, you MUST complete the form and renew your placard.    DIRECTIONS: Both sides of this document must be signed and completed fully. All fields are required.   Applicants complete Part 1. If the applicant is a MINOR, then Parent/Guardian(s) MUST also complete Part 2. The applicant's medical professional MUSTcomplete Part 3. If the applicant is applying for meter-exempt parking, his/her medical professional MUST also complete Part 4.    PART 1:   Applicant Information (MUST have a valid [place] 's license and/or ID card)  I hereby certify  that I meet the definition of a person with a disability as provided in [facility] 5/1-159.1, and I certify  that my physical condition entitles me to the issuance of a Persons with Disabilities Parking Placard. By affixing my signature below, I understand that the parking placard may not be used unless I am the  or passenger of the vehicle.     *If a  , please provide a copy of your  showing proof of service.     Disability Parking Placard # (if any)     Full Name of Person with Disability (If minor, complete Part 2 also)    Cristina Gonzalez  Male/Female  female  Date of Birth   9/13/1975    Valid [place] ’s License or ID Card # of Applicant                                                                                                  [place] Address  [facility] [address] [place] [address]    Mailing Address if Different from Above   Telephone Number  [phone] (home)  Email Address   [email or web address]  Keyes?  Yes/No     Signature of Person with Disability Today’s Date  1/29/2025     PART 2:   For Parent or Legal Guardian (MUST have a valid 's license and/or ID card)  I hereby certify that the above applicant is a minor and I have primary responsibility for his/her transportation. By affixing my signature below, I understand that the disability placard is issued to the person with the disability and may not be used unless I am transporting the disabled person in the vehicle.     Name of Parent or Legal Guardian   Relationship to Person with Disability   Valid [place] ’s License or ID Card #          [place] Address Apt/Unit# City State Zip   Telephone Number Email Address   Signature of Parent or Legal Guardian Today’s Date  1/29/2025     Warning: Any misuse of the disability parking placard/plates or making a false application may result in the revocation of the placard, a 12-month suspension or revocation of your drivers license, and a fine of up to $1,000.    Temporary Disabled Parking Placard Applications -- May be taken to any [facility] facility or mailed in.  Permanent Disables Parking Placard Applications -- MUST be mailed to the following address:  , Persons with Disabilities Placard Unit, [address] [place] [address], [address], [place] [address].    *If you have a permanent disability placard and would like a Persons with Disabilities License Plate, please visit your local  facility to apply. You will need your permanent placard number and current plate number or RADHA.     Please complete Page 2 to ensure timely processing.    Printed by authority of the [facility]. July 2021 -- 1 -- VSD 62.28      Part 3: Medical Eligibility Standards and Medical Professionals Certification  As the medical professional(s) executing this document and verifying the nature of the applicant's disability, I understand that making a false representation of a person's disability  for the purposes of obtaining any type of a disabled parking placard may result in suspension or revocation of my license and a fine of up to $1,000. As a licensed physician, advanced practice nurse, optometrist, chiropractor or physician's assistant, I certify the applicant has a condition that constitutes him/her as a person with disabilities.   Length of Disability: (Check one)   [x]   Temporary Disability; the duration of this disability is _____6 months________________ (maximum 6 months)  []   Permanent Disability  []   Meter-Exempt Disability (Must complete and sign Part 4 also.)  Check all that apply (MUST check at least one):  []  Is restricted by a lung disease to such a degree that the person’s forced (respiratory) expiratory volume (FEV) for 1 second, when measured by spirometry, is less than 1 liter.  []   Uses a portable oxygen device.  []   Has a Class III or Class IV cardiac condition according to the standards set by the American Heart Association.  []   Cannot walk without the use of or assistance from a wheelchair, a walker, a crutch, a brace, a prosthetic device or another person.  [x]   Is severely limited in the ability to walk due to an arthritic, neurological, oncological or orthopedic condition.  [x]   Cannot walk 200 feet without stopping to rest because of one of the above five conditions.  Check all that apply: (MUST check at least one diagnosis):  []Amputation of extremity(s)_________________ [] Arthritis of the ______________________  []Spina Bifida     []Osteoarthritis of the __________________   []Multiple Sclerosis    [] Chronic Pain due to ___________________  []Quadriplegia/Paraplegia   [] Legally Blind with limited mobility  [] Cerebral Palsy  [x] Other Diagnosis: _____lumbar radiculopathy ____________________________________________________________    If none of the above conditions apply, list the medical condition that impacts the person’s mobility.     Medical Professional’s  Printed Name*   Gregorio Martin MD Specialty  Neurosurgery   Office Address   [facility], [facility], [place]  [address] [address]  Dept: [phone]  Dept Fax: [phone]   Medical Professional’s Signature   State Professional License Number (NOT NPI#)  [id number] Today’s Date                  1/29/2025    Signature of Collaborating/Supervising Physician (if signed above by resident/assistant) Supervising State Professional License Number             PART 4: Medical Eligibility for Meter-Exempt Parking  The meter-exempt parking certification must be completed only when the applicant qualifies. To qualify, the applicant MUST have a VALID  [place] 's license, have an ambulatory disability described in Part 3, and also have one of the following conditions listed below.  Economic need is not a consideration for meter-exempt parking    The applicant is eligible for meter-exempt parking as provided by statue due to the following PERMANENT medical condition or disability:    Check all that apply:  [] Cannot manage, manipulate, or insert coins, or obtain tickets in parking meters/ticket machines due to lack of fine motor control of BOTH hands.  [] Cannot reach above his/her head to a height of 42 inches from the ground due to a lack of finger, hand or upper-extremity strength or mobility.  [] Cannot approach a parking meter due to his/her use of a wheelchair or other device for mobility.  [] Cannot walk more than 20 feet due to an orthopedic, neurological, cardiovascular, or lung condition in which the degree of debilitation is so severe that it almost completely impedes the ability to walk.  [] Missing a hand(s) or arm(s) or has permanently lost the use of a hand or arm.  [] Patient is under 18 years of age and incapable of driving.     Medical Professional’s Signature State Professional License Number (NOT NPI#)   Today’s Date                  1/29/2025     Signature of Collaborating/Supervising Physician (if signed above by resident/assistant) Supervising State Professional License Number     FOR  OFFICE USE ONLY     Parking Placard Number:  Expiration Date:   Issued By: Issued Date:

## (undated) NOTE — LETTER
25  RE: Cristina Madrid Lisa     : 1975    Dear Dr. Coombs,    This letter is to inform you that your patient has been scheduled for surgery with Dr. Martin on 2025 at [facility]. Pre-operative clearance has been requested within 30 days of surgery.  We have asked the patient to contact your office to schedule a pre-operative visit.     Diagnosis: Degeneration of intervertebral disc of lumbar region with discogenic back pain and lower extremity pain   Procedure: Minimally Evasive Left L4-5, L5-S1 Laminectomies and L4-5 Discectomy     A Pre-operative History & Physical is needed for medical clearance within 30 days of surgery. Please address patient's active problems and potential risks of having surgery considering their medical history.  Pre-op labs are scheduled through the [facility] department. If any labs/testing are being done through the PCP office, then results should be faxed to the pre-admission testing department at [facility] at [phone]. Our pre-operative lab orders are located in our surgery order, if the patient would like these done through your office, you will need to place separate orders.     Please fax clearance letter/office visit note to our office at fax #: [phone]. Your office note must clearly indicate if the patient is medically cleared for surgery or not.    The following orders will be placed by pre-admission testing:  CBC  CMP  Type and Screen   PT/PTT/INR  MRSA/MSSA Nasal Swab  EKG  Chest X-ray   (*And any other pertinent testing based on patient's current clinical condition.)    If you have any questions, you may contact our office at [phone], option # 2.    Thank you,  MANUEL STAFF

## (undated) NOTE — LETTER
WHERE IS YOUR PAIN NOW?  Connie the areas on your body where you feel the described sensations.  Use the appropriate symbol.  Connie the areas of radiation.  Include all affected areas.  Just to complete the picture, please draw in the face.     ACHE:  ^ ^ ^   NUMBNESS:  0000   PINS & NEEDLES:  = = = =                              ^ ^ ^                       0000              = = = =                                    ^ ^ ^                       0000            = = = =      BURNING:  XXXX   STABBING: ////                  XXXX                ////                         XXXX          ////     Please connie the line below indicating your degree of pain right now  with 0 being no pain 10 being the worst pain possible.                                         0             1             2              3             4              5              6              7             8             9             10         Patient Signature: